# Patient Record
Sex: FEMALE | Race: WHITE | Employment: OTHER | URBAN - METROPOLITAN AREA
[De-identification: names, ages, dates, MRNs, and addresses within clinical notes are randomized per-mention and may not be internally consistent; named-entity substitution may affect disease eponyms.]

---

## 2022-11-18 ENCOUNTER — OFFICE VISIT (OUTPATIENT)
Dept: FAMILY MEDICINE CLINIC | Age: 67
End: 2022-11-18
Payer: MEDICARE

## 2022-11-18 VITALS
TEMPERATURE: 97.3 F | DIASTOLIC BLOOD PRESSURE: 73 MMHG | OXYGEN SATURATION: 97 % | BODY MASS INDEX: 19.2 KG/M2 | RESPIRATION RATE: 16 BRPM | SYSTOLIC BLOOD PRESSURE: 159 MMHG | HEART RATE: 64 BPM | WEIGHT: 108.38 LBS | HEIGHT: 63 IN

## 2022-11-18 DIAGNOSIS — L84 CORN OR CALLUS: ICD-10-CM

## 2022-11-18 DIAGNOSIS — G89.29 CHRONIC LOW BACK PAIN WITH SCIATICA, SCIATICA LATERALITY UNSPECIFIED, UNSPECIFIED BACK PAIN LATERALITY: ICD-10-CM

## 2022-11-18 DIAGNOSIS — K08.9 CHRONIC DENTAL PAIN: ICD-10-CM

## 2022-11-18 DIAGNOSIS — Z23 ENCOUNTER FOR IMMUNIZATION: ICD-10-CM

## 2022-11-18 DIAGNOSIS — I10 ESSENTIAL HYPERTENSION: ICD-10-CM

## 2022-11-18 DIAGNOSIS — J44.9 MIXED TYPE COPD (CHRONIC OBSTRUCTIVE PULMONARY DISEASE) (HCC): ICD-10-CM

## 2022-11-18 DIAGNOSIS — Z00.00 MEDICARE ANNUAL WELLNESS VISIT, SUBSEQUENT: Primary | ICD-10-CM

## 2022-11-18 DIAGNOSIS — I25.10 CORONARY ARTERY DISEASE INVOLVING NATIVE CORONARY ARTERY OF NATIVE HEART WITHOUT ANGINA PECTORIS: ICD-10-CM

## 2022-11-18 DIAGNOSIS — Z85.118 HX OF CANCER OF LUNG: ICD-10-CM

## 2022-11-18 DIAGNOSIS — M54.40 CHRONIC LOW BACK PAIN WITH SCIATICA, SCIATICA LATERALITY UNSPECIFIED, UNSPECIFIED BACK PAIN LATERALITY: ICD-10-CM

## 2022-11-18 DIAGNOSIS — G89.29 CHRONIC DENTAL PAIN: ICD-10-CM

## 2022-11-18 PROBLEM — F17.209 TOBACCO USE DISORDER, CONTINUOUS: Status: ACTIVE | Noted: 2022-11-18

## 2022-11-18 PROCEDURE — G8400 PT W/DXA NO RESULTS DOC: HCPCS | Performed by: FAMILY MEDICINE

## 2022-11-18 PROCEDURE — 1090F PRES/ABSN URINE INCON ASSESS: CPT | Performed by: FAMILY MEDICINE

## 2022-11-18 PROCEDURE — 1101F PT FALLS ASSESS-DOCD LE1/YR: CPT | Performed by: FAMILY MEDICINE

## 2022-11-18 PROCEDURE — G0439 PPPS, SUBSEQ VISIT: HCPCS | Performed by: FAMILY MEDICINE

## 2022-11-18 PROCEDURE — G8510 SCR DEP NEG, NO PLAN REQD: HCPCS | Performed by: FAMILY MEDICINE

## 2022-11-18 PROCEDURE — 1123F ACP DISCUSS/DSCN MKR DOCD: CPT | Performed by: FAMILY MEDICINE

## 2022-11-18 PROCEDURE — 99203 OFFICE O/P NEW LOW 30 MIN: CPT | Performed by: FAMILY MEDICINE

## 2022-11-18 PROCEDURE — G8536 NO DOC ELDER MAL SCRN: HCPCS | Performed by: FAMILY MEDICINE

## 2022-11-18 PROCEDURE — 3077F SYST BP >= 140 MM HG: CPT | Performed by: FAMILY MEDICINE

## 2022-11-18 PROCEDURE — G0008 ADMIN INFLUENZA VIRUS VAC: HCPCS | Performed by: FAMILY MEDICINE

## 2022-11-18 PROCEDURE — 90694 VACC AIIV4 NO PRSRV 0.5ML IM: CPT | Performed by: FAMILY MEDICINE

## 2022-11-18 PROCEDURE — G8427 DOCREV CUR MEDS BY ELIG CLIN: HCPCS | Performed by: FAMILY MEDICINE

## 2022-11-18 PROCEDURE — 3078F DIAST BP <80 MM HG: CPT | Performed by: FAMILY MEDICINE

## 2022-11-18 PROCEDURE — 3017F COLORECTAL CA SCREEN DOC REV: CPT | Performed by: FAMILY MEDICINE

## 2022-11-18 PROCEDURE — G8420 CALC BMI NORM PARAMETERS: HCPCS | Performed by: FAMILY MEDICINE

## 2022-11-18 RX ORDER — BUDESONIDE AND FORMOTEROL FUMARATE DIHYDRATE 160; 4.5 UG/1; UG/1
AEROSOL RESPIRATORY (INHALATION)
COMMUNITY
Start: 2022-08-22 | End: 2022-11-18 | Stop reason: SDUPTHER

## 2022-11-18 RX ORDER — CLOPIDOGREL BISULFATE 75 MG/1
75 TABLET ORAL DAILY
Qty: 90 TABLET | Refills: 1 | Status: SHIPPED | OUTPATIENT
Start: 2022-11-18

## 2022-11-18 RX ORDER — CARVEDILOL 25 MG/1
25 TABLET ORAL 2 TIMES DAILY WITH MEALS
COMMUNITY
End: 2022-11-18 | Stop reason: SDUPTHER

## 2022-11-18 RX ORDER — NITROGLYCERIN 0.4 MG/1
0.4 TABLET SUBLINGUAL
COMMUNITY

## 2022-11-18 RX ORDER — LISINOPRIL 5 MG/1
5 TABLET ORAL DAILY
Qty: 90 TABLET | Refills: 1 | Status: SHIPPED | OUTPATIENT
Start: 2022-11-18

## 2022-11-18 RX ORDER — AMOXICILLIN AND CLAVULANATE POTASSIUM 875; 125 MG/1; MG/1
1 TABLET, FILM COATED ORAL 2 TIMES DAILY
Qty: 20 TABLET | Refills: 0 | Status: SHIPPED | OUTPATIENT
Start: 2022-11-18 | End: 2022-11-28

## 2022-11-18 RX ORDER — CLOPIDOGREL BISULFATE 75 MG/1
TABLET ORAL
COMMUNITY
Start: 2022-08-22 | End: 2022-11-18 | Stop reason: SDUPTHER

## 2022-11-18 RX ORDER — LORAZEPAM 0.5 MG/1
0.5 TABLET ORAL
COMMUNITY

## 2022-11-18 RX ORDER — FLUTICASONE PROPIONATE AND SALMETEROL XINAFOATE 115; 21 UG/1; UG/1
2 AEROSOL, METERED RESPIRATORY (INHALATION) 2 TIMES DAILY
Qty: 12 G | Refills: 5 | Status: SHIPPED | OUTPATIENT
Start: 2022-11-18

## 2022-11-18 RX ORDER — CHOLECALCIFEROL (VITAMIN D3) 125 MCG
100 CAPSULE ORAL DAILY
COMMUNITY

## 2022-11-18 RX ORDER — CARVEDILOL 25 MG/1
25 TABLET ORAL 2 TIMES DAILY WITH MEALS
Qty: 180 TABLET | Refills: 1 | Status: SHIPPED | OUTPATIENT
Start: 2022-11-18

## 2022-11-18 RX ORDER — ATORVASTATIN CALCIUM 40 MG/1
40 TABLET, FILM COATED ORAL DAILY
COMMUNITY
End: 2022-11-18 | Stop reason: SDUPTHER

## 2022-11-18 RX ORDER — PREDNISONE 50 MG/1
50 TABLET ORAL DAILY
COMMUNITY
End: 2022-11-18 | Stop reason: ALTCHOICE

## 2022-11-18 RX ORDER — ATORVASTATIN CALCIUM 40 MG/1
40 TABLET, FILM COATED ORAL DAILY
Qty: 90 TABLET | Refills: 1 | Status: SHIPPED | OUTPATIENT
Start: 2022-11-18

## 2022-11-18 RX ORDER — GABAPENTIN 300 MG/1
300 CAPSULE ORAL 3 TIMES DAILY
Qty: 270 CAPSULE | Refills: 1 | Status: SHIPPED | OUTPATIENT
Start: 2022-11-18

## 2022-11-18 RX ORDER — LISINOPRIL 5 MG/1
TABLET ORAL DAILY
COMMUNITY
End: 2022-11-18 | Stop reason: SDUPTHER

## 2022-11-18 RX ORDER — GABAPENTIN 300 MG/1
CAPSULE ORAL 3 TIMES DAILY
COMMUNITY
Start: 2022-11-02 | End: 2022-11-18 | Stop reason: SDUPTHER

## 2022-11-18 RX ORDER — CYANOCOBALAMIN (VITAMIN B-12) 250 MCG
250 TABLET ORAL DAILY
COMMUNITY

## 2022-11-18 RX ORDER — ALBUTEROL SULFATE 90 UG/1
AEROSOL, METERED RESPIRATORY (INHALATION)
COMMUNITY

## 2022-11-18 RX ORDER — ASPIRIN 81 MG/1
TABLET ORAL DAILY
COMMUNITY

## 2022-11-18 NOTE — PATIENT INSTRUCTIONS
Medicare Wellness Visit, Female     The best way to live healthy is to have a lifestyle where you eat a well-balanced diet, exercise regularly, limit alcohol use, and quit all forms of tobacco/nicotine, if applicable. Regular preventive services are another way to keep healthy. Preventive services (vaccines, screening tests, monitoring & exams) can help personalize your care plan, which helps you manage your own care. Screening tests can find health problems at the earliest stages, when they are easiest to treat. [facility] follows the current, evidence-based guidelines published by the [facility] ([facility]) when recommending preventive services for our patients. Because we follow these guidelines, sometimes recommendations change over time as research supports it. (For example, mammograms used to be recommended annually. Even though Medicare will still pay for an annual mammogram, the newer guidelines recommend a mammogram every two years for women of average risk). Of course, you and your doctor may decide to screen more often for some diseases, based on your risk and your co-morbidities (chronic disease you are already diagnosed with). Preventive services for you include:  - Medicare offers their members a free annual wellness visit, which is time for you and your primary care provider to discuss and plan for your preventive service needs. Take advantage of this benefit every year!  -All adults over the age of 72 should receive the recommended pneumonia vaccines. Current USPSTF guidelines recommend a series of two vaccines for the best pneumonia protection.   -All adults should have a flu vaccine yearly and a tetanus vaccine every 10 years.   -All adults age 48 and older should receive the shingles vaccines (series of two vaccines).       -All adults age 38-68 who are overweight should have a diabetes screening test once every three years.   -All adults born between [birth year] and 1966 should be screened once for Hepatitis C.  -Other screening tests and preventive services for persons with diabetes include: an eye exam to screen for diabetic retinopathy, a kidney function test, a foot exam, and stricter control over your cholesterol.   -Cardiovascular screening for adults with routine risk involves an electrocardiogram (ECG) at intervals determined by your doctor.   -Colorectal cancer screenings should be done for adults age 54-65 with no increased risk factors for colorectal cancer. There are a number of acceptable methods of screening for this type of cancer. Each test has its own benefits and drawbacks. Discuss with your doctor what is most appropriate for you during your annual wellness visit. The different tests include: colonoscopy (considered the best screening method), a fecal occult blood test, a fecal DNA test, and sigmoidoscopy.    -A bone mass density test is recommended when a woman turns 65 to screen for osteoporosis. This test is only recommended one time, as a screening. Some providers will use this same test as a disease monitoring tool if you already have osteoporosis. -Breast cancer screenings are recommended every other year for women of normal risk, age 54-69.  -Cervical cancer screenings for women over age 72 are only recommended with certain risk factors.      Here is a list of your current Health Maintenance items (your personalized list of preventive services) with a due date:  Health Maintenance Due   Topic Date Due    Hepatitis C Test  Never done    Depresssion Screening  Never done    COVID-19 Vaccine (1) Never done    DTaP/Tdap/Td  (1 - Tdap) Never done    Cholesterol Test   Never done    Colorectal Screening  Never done    Shingles Vaccine (1 of 2) Never done    Mammogram  Never done    Bone Mineral Density   Never done    Pneumococcal Vaccine (1 - PCV) Never done    Yearly Flu Vaccine (1) Never done

## 2022-11-18 NOTE — PROGRESS NOTES
11/18/2022      Chief Complaint   Patient presents with    [facility]    Annual Wellness Visit         History of Present Illness:        Tracie Jimenez is a 75 y.o. female relocated from [facility] to be near her son [facility]. Has CAD and COPD, continues to smoke. Previously on lorazepam for anxiety, has been off since August. Declines to treat this with SSRIs or other mood agents. Taking gabapentin chronically for low back pain with radicular sxs. Having painful corns on the feet, had seen podiatry before leaving [place]. Allergies   Allergen Reactions    Sulfa (Sulfonamide Antibiotics) Hives       Current Outpatient Medications   Medication Sig    aspirin delayed-release 81 mg tablet Take  by mouth daily. nitroglycerin (NITROSTAT) 0.4 mg SL tablet 0.4 mg by SubLINGual route every five (5) minutes as needed for Chest Pain. Up to 3 doses. albuterol (PROVENTIL HFA, VENTOLIN HFA, PROAIR HFA) 90 mcg/actuation inhaler Take  by inhalation. co-enzyme Q-10 (Co Q-10) 100 mg capsule Take 100 mg by mouth daily. cyanocobalamin (VITAMIN B12) 250 mcg tablet Take 250 mcg by mouth daily. atorvastatin (LIPITOR) 40 mg tablet Take 1 Tablet by mouth daily. fluticasone propion-salmeteroL (Advair HFA) 115-21 mcg/actuation inhaler Take 2 Puffs by inhalation two (2) times a day. carvediloL (COREG) 25 mg tablet Take 1 Tablet by mouth two (2) times daily (with meals). clopidogreL (PLAVIX) 75 mg tab Take 1 Tablet by mouth daily. gabapentin (NEURONTIN) 300 mg capsule Take 1 Capsule by mouth three (3) times daily. Max Daily Amount: 900 mg.    ipratropium (Atrovent HFA) 17 mcg/actuation inhaler Take 2 Puffs by inhalation every four (4) hours as needed for Wheezing. lisinopriL (PRINIVIL, ZESTRIL) 5 mg tablet Take 1 Tablet by mouth daily. amoxicillin-clavulanate (AUGMENTIN) 875-125 mg per tablet Take 1 Tablet by mouth two (2) times a day for 10 days.     LORazepam (ATIVAN) 0.5 mg tablet Take 0.5 mg by mouth. (Patient not taking: Reported on 11/18/2022)     No current facility-administered medications for this visit. Physical Examination:    Visit Vitals  BP (!) 159/73 (BP 1 Location: Left upper arm, BP Patient Position: Sitting, BP Cuff Size: Adult)   Pulse 64   Temp 97.3 °F (36.3 °C) (Oral)   Resp 16   Ht 5' 3\" (1.6 m)   Wt 108 lb 6 oz (49.2 kg)   SpO2 97%   BMI 19.20 kg/m²      General:  Alert, cooperative, no distress. HEENT:  Normocephalic, without obvious abnormality, atraumatic. Conjunctivae/corneas clear. Pupils equal, round, reactive to light. Extraocular movements intact. TMs and external canals normal bilaterally. Nasal mucosa and oropharynx clear. Lungs: Clear to auscultation bilaterally. Chest wall:  No tenderness or deformity. Heart:  Regular rate and rhythm, S1, S2 normal, no murmur, click, rub, or gallop. Abdomen:   Soft, non-tender. Bowel sounds normal. No masses. No organomegaly. Extremities: Extremities normal, atraumatic, no cyanosis or edema. Pulses: 2+ and symmetric all extremities. Skin: Skin color, texture, turgor normal. No rashes or lesions. Lymph nodes: Cervical, supraclavicular, and axillary nodes normal.   Neurologic: CNII-XII intact. Normal strength, sensation, and reflexes throughout. ASSESSMENT AND PLAN    1. Medicare annual wellness visit, subsequent      2. Hx of cancer of lung  Encouraged to stop smoking    3. Coronary artery disease involving native coronary artery of native heart without angina pectoris    - atorvastatin (LIPITOR) 40 mg tablet; Take 1 Tablet by mouth daily. Dispense: 90 Tablet; Refill: 1  - carvediloL (COREG) 25 mg tablet; Take 1 Tablet by mouth two (2) times daily (with meals). Dispense: 180 Tablet; Refill: 1  - clopidogreL (PLAVIX) 75 mg tab; Take 1 Tablet by mouth daily. Dispense: 90 Tablet; Refill: 1    4.  Chronic low back pain with sciatica, sciatica laterality unspecified, unspecified back pain laterality    - gabapentin (NEURONTIN) 300 mg capsule; Take 1 Capsule by mouth three (3) times daily. Max Daily Amount: 900 mg. Dispense: 270 Capsule; Refill: 1    5. Essential hypertension    - carvediloL (COREG) 25 mg tablet; Take 1 Tablet by mouth two (2) times daily (with meals). Dispense: 180 Tablet; Refill: 1  - lisinopriL (PRINIVIL, ZESTRIL) 5 mg tablet; Take 1 Tablet by mouth daily. Dispense: 90 Tablet; Refill: 1    6. Corn or callus    - REFERRAL TO PODIATRY    7. Encounter for immunization    - INFLUENZA, FLUAD, (AGE 65 Y+), IM, PF, 0.5 ML    8. Mixed type COPD (chronic obstructive pulmonary disease) (HCC)    - fluticasone propion-salmeteroL (Advair HFA) 115-21 mcg/actuation inhaler; Take 2 Puffs by inhalation two (2) times a day. Dispense: 12 g; Refill: 5  - ipratropium (Atrovent HFA) 17 mcg/actuation inhaler; Take 2 Puffs by inhalation every four (4) hours as needed for Wheezing. Dispense: 12.9 g; Refill: 5    9. Chronic dental pain    - amoxicillin-clavulanate (AUGMENTIN) 875-125 mg per tablet; Take 1 Tablet by mouth two (2) times a day for 10 days. Dispense: 20 Tablet; Refill: 0            Orders Placed This Encounter    Influenza, FLUAD, (age 72 y+), IM, PF, 0.5 mL    REFERRAL TO PODIATRY     Referral Priority:   Routine     Referral Type:   Consultation     Referral Reason:   Specialty Services Required     Referred to Provider:   Mary Shea DPM     Number of Visits Requested:   1    DISCONTD: budesonide-formoteroL (SYMBICORT) 160-4.5 mcg/actuation HFAA    DISCONTD: gabapentin (NEURONTIN) 300 mg capsule     Sig: three (3) times daily. DISCONTD: clopidogreL (PLAVIX) 75 mg tab    DISCONTD: atorvastatin (LIPITOR) 40 mg tablet     Sig: Take 40 mg by mouth daily. DISCONTD: predniSONE (DELTASONE) 50 mg tablet     Sig: Take 50 mg by mouth daily. aspirin delayed-release 81 mg tablet     Sig: Take  by mouth daily.     nitroglycerin (NITROSTAT) 0.4 mg SL tablet     Si.4 mg by SubLINGual route every five (5) minutes as needed for Chest Pain. Up to 3 doses. LORazepam (ATIVAN) 0.5 mg tablet     Sig: Take 0.5 mg by mouth. DISCONTD: carvediloL (COREG) 25 mg tablet     Sig: Take 25 mg by mouth two (2) times daily (with meals). DISCONTD: lisinopriL (PRINIVIL, ZESTRIL) 5 mg tablet     Sig: Take  by mouth daily. DISCONTD: ipratropium (Atrovent HFA) 17 mcg/actuation inhaler     Sig: Take 1 Puff by inhalation. DISCONTD: fluticasone propionate (FLOVENT DISKUS) 50 mcg/actuation inhaler     Sig: Take  by inhalation. albuterol (PROVENTIL HFA, VENTOLIN HFA, PROAIR HFA) 90 mcg/actuation inhaler     Sig: Take  by inhalation. co-enzyme Q-10 (Co Q-10) 100 mg capsule     Sig: Take 100 mg by mouth daily. cyanocobalamin (VITAMIN B12) 250 mcg tablet     Sig: Take 250 mcg by mouth daily. atorvastatin (LIPITOR) 40 mg tablet     Sig: Take 1 Tablet by mouth daily. Dispense:  90 Tablet     Refill:  1    fluticasone propion-salmeteroL (Advair HFA) 115-21 mcg/actuation inhaler     Sig: Take 2 Puffs by inhalation two (2) times a day. Dispense:  12 g     Refill:  5    carvediloL (COREG) 25 mg tablet     Sig: Take 1 Tablet by mouth two (2) times daily (with meals). Dispense:  180 Tablet     Refill:  1    clopidogreL (PLAVIX) 75 mg tab     Sig: Take 1 Tablet by mouth daily. Dispense:  90 Tablet     Refill:  1    gabapentin (NEURONTIN) 300 mg capsule     Sig: Take 1 Capsule by mouth three (3) times daily. Max Daily Amount: 900 mg. Dispense:  270 Capsule     Refill:  1    ipratropium (Atrovent HFA) 17 mcg/actuation inhaler     Sig: Take 2 Puffs by inhalation every four (4) hours as needed for Wheezing. Dispense:  12.9 g     Refill:  5    lisinopriL (PRINIVIL, ZESTRIL) 5 mg tablet     Sig: Take 1 Tablet by mouth daily. Dispense:  90 Tablet     Refill:  1    amoxicillin-clavulanate (AUGMENTIN) 875-125 mg per tablet     Sig: Take 1 Tablet by mouth two (2) times a day for 10 days.      Dispense:  20 Tablet     Refill:  0     RTC 3 months      Gracie Santos MD

## 2022-11-18 NOTE — PROGRESS NOTES
1. \"Have you been to the ER, urgent care clinic since your last visit? Hospitalized since your last visit? \" No    2. \"Have you seen or consulted any other health care providers outside of the [address] [place] [address] since your last visit? \" No     3. For patients aged 39-70: Has the patient had a colonoscopy / FIT/ Cologuard? No      If the patient is female:    4. For patients aged 41-77: Has the patient had a mammogram within the past 2 years? No      5. For patients aged 21-65: Has the patient had a pap smear? NA - based on age or sex      Functional Ability:   Does the patient exhibit a steady gait? yes   How long did it take the patient to get up and walk from a sitting position? 10 seconds   Is the patient self reliant?  (ie can do own laundry, meals, household chores)  yes     Does the patient handle his/her own medications? yes     Does the patient handle his/her own money? yes     Is the patients home safe (ie good lighting, handrails on stairs and bath, etc.)? yes     Did you notice or did patient express any hearing difficulties? no     Did you notice or did patient express any vision difficulties?   no     Were distance and reading eye charts used? no       Advance Care Planning:   Patient was offered the opportunity to discuss advance care planning:  yes     Does patient have an Advance Directive:  no   If no, did you provide information on Caring Connections? yes     No flowsheet data found. Abuse Screening Questionnaire 11/18/2022   Do you ever feel afraid of your partner? N   Are you in a relationship with someone who physically or mentally threatens you? N   Is it safe for you to go home? Y         This is the Subsequent Medicare Annual Wellness Exam, performed 12 months or more after the Initial AWV or the last Subsequent AWV    I have reviewed the patient's medical history in detail and updated the computerized patient record.        Assessment/Plan   Education and counseling provided:  Are appropriate based on today's review and evaluation    1. Medicare annual wellness visit, subsequent     Depression Risk Factor Screening     3 most recent PHQ Screens 11/18/2022   Little interest or pleasure in doing things Not at all   Feeling down, depressed, irritable, or hopeless Not at all   Total Score PHQ 2 0       Alcohol & Drug Abuse Risk Screen    Do you average more than 1 drink per night or more than 7 drinks a week:  No    On any one occasion in the past three months have you have had more than 3 drinks containing alcohol:  Yes          Functional Ability and Level of Safety    Hearing: Hearing is good. Activities of Daily Living: The home contains: no safety equipment. Patient does total self care      Ambulation: with no difficulty     Fall Risk:  Fall Risk Assessment, last 12 mths 11/18/2022   Able to walk? Yes   Fall in past 12 months? 0   Do you feel unsteady? 0   Are you worried about falling 0      Abuse Screen:  Patient is not abused       Cognitive Screening    Has your family/caregiver stated any concerns about your memory: no     Cognitive Screening: Normal - Clock Drawing Test    Health Maintenance Due     Health Maintenance Due   Topic Date Due    Hepatitis C Screening  Never done    Depression Screen  Never done    COVID-19 Vaccine (1) Never done    DTaP/Tdap/Td series (1 - Tdap) Never done    Lipid Screen  Never done    Colorectal Cancer Screening Combo  Never done    Shingrix Vaccine Age 50> (1 of 2) Never done    Breast Cancer Screen Mammogram  Never done    Bone Densitometry (Dexa) Screening  Never done    Pneumococcal 65+ years (1 - PCV) Never done    Flu Vaccine (1) Never done       Patient Care Team   Patient Care Team:  Anna Schwartz MD as PCP - General (Family Medicine)    History   There is no problem list on file for this patient. No past medical history on file. No past surgical history on file.   Current Outpatient Medications   Medication Sig Dispense Refill    budesonide-formoteroL (SYMBICORT) 160-4.5 mcg/actuation HFAA       gabapentin (NEURONTIN) 300 mg capsule three (3) times daily. clopidogreL (PLAVIX) 75 mg tab       atorvastatin (LIPITOR) 40 mg tablet Take 40 mg by mouth daily. predniSONE (DELTASONE) 50 mg tablet Take 50 mg by mouth daily. aspirin delayed-release 81 mg tablet Take  by mouth daily. nitroglycerin (NITROSTAT) 0.4 mg SL tablet 0.4 mg by SubLINGual route every five (5) minutes as needed for Chest Pain. Up to 3 doses. carvediloL (COREG) 25 mg tablet Take 25 mg by mouth two (2) times daily (with meals). lisinopriL (PRINIVIL, ZESTRIL) 5 mg tablet Take  by mouth daily. ipratropium (Atrovent HFA) 17 mcg/actuation inhaler Take 1 Puff by inhalation. fluticasone propionate (FLOVENT DISKUS) 50 mcg/actuation inhaler Take  by inhalation. albuterol (PROVENTIL HFA, VENTOLIN HFA, PROAIR HFA) 90 mcg/actuation inhaler Take  by inhalation. co-enzyme Q-10 (Co Q-10) 100 mg capsule Take 100 mg by mouth daily. cyanocobalamin (VITAMIN B12) 250 mcg tablet Take 250 mcg by mouth daily. LORazepam (ATIVAN) 0.5 mg tablet Take 0.5 mg by mouth. (Patient not taking: Reported on 11/18/2022)       Allergies   Allergen Reactions    Sulfa (Sulfonamide Antibiotics) Hives       No family history on file.   Social History     Tobacco Use    Smoking status: Every Day     Types: Cigarettes    Smokeless tobacco: Never   Substance Use Topics    Alcohol use: Yes         Brittany Huerta LPN

## 2022-12-21 RX ORDER — ALBUTEROL SULFATE 90 UG/1
2 AEROSOL, METERED RESPIRATORY (INHALATION)
Qty: 18 G | Refills: 3 | Status: SHIPPED | OUTPATIENT
Start: 2022-12-21

## 2023-01-18 RX ORDER — NITROGLYCERIN 0.4 MG/1
0.4 TABLET SUBLINGUAL
Qty: 20 TABLET | Refills: 5 | Status: SHIPPED | OUTPATIENT
Start: 2023-01-18

## 2023-01-18 NOTE — TELEPHONE ENCOUNTER
Requested Prescriptions     Pending Prescriptions Disp Refills    nitroglycerin (NITROSTAT) 0.4 mg SL tablet       Si Tablet by SubLINGual route every five (5) minutes as needed for Chest Pain. Up to 3 doses.      Patient says her prescription has

## 2023-02-07 ENCOUNTER — VIRTUAL VISIT (OUTPATIENT)
Dept: FAMILY MEDICINE CLINIC | Age: 68
End: 2023-02-07
Payer: MEDICARE

## 2023-02-07 DIAGNOSIS — J44.9 MIXED TYPE COPD (CHRONIC OBSTRUCTIVE PULMONARY DISEASE) (HCC): ICD-10-CM

## 2023-02-07 DIAGNOSIS — R05.9 COUGH IN ADULT: Primary | ICD-10-CM

## 2023-02-07 PROCEDURE — 3017F COLORECTAL CA SCREEN DOC REV: CPT | Performed by: FAMILY MEDICINE

## 2023-02-07 PROCEDURE — G8432 DEP SCR NOT DOC, RNG: HCPCS | Performed by: FAMILY MEDICINE

## 2023-02-07 PROCEDURE — 1123F ACP DISCUSS/DSCN MKR DOCD: CPT | Performed by: FAMILY MEDICINE

## 2023-02-07 PROCEDURE — G8420 CALC BMI NORM PARAMETERS: HCPCS | Performed by: FAMILY MEDICINE

## 2023-02-07 PROCEDURE — G8400 PT W/DXA NO RESULTS DOC: HCPCS | Performed by: FAMILY MEDICINE

## 2023-02-07 PROCEDURE — 1090F PRES/ABSN URINE INCON ASSESS: CPT | Performed by: FAMILY MEDICINE

## 2023-02-07 PROCEDURE — G8427 DOCREV CUR MEDS BY ELIG CLIN: HCPCS | Performed by: FAMILY MEDICINE

## 2023-02-07 PROCEDURE — G8536 NO DOC ELDER MAL SCRN: HCPCS | Performed by: FAMILY MEDICINE

## 2023-02-07 PROCEDURE — 1101F PT FALLS ASSESS-DOCD LE1/YR: CPT | Performed by: FAMILY MEDICINE

## 2023-02-07 PROCEDURE — G2025 DIS SITE TELE SVCS RHC/FQHC: HCPCS | Performed by: FAMILY MEDICINE

## 2023-02-07 NOTE — PROGRESS NOTES
Jess Simmons is a 75 y.o. female who was seen by synchronous (real-time) audio-video technology on 2/7/2023 for Cough (Productive with Greenish/Yellow Phlegm )        Assessment & Plan:   Diagnoses and all orders for this visit:    1. Cough in adult    2. Mixed type COPD (chronic obstructive pulmonary disease) ([facility] [address]      Patient declines to do drive through COVID testing as first step in diagnosis and treatment despite explanation of it's importance. Questioned answered. She will call if she changes her mind. Subjective:   Ten days of cough productive of sputum without fever and no increased SOB or wheezing. No CP. Had initial COVID series, no subsequent boosters. No known sick contacts. Prior to Admission medications    Medication Sig Start Date End Date Taking? Authorizing Provider   nitroglycerin (NITROSTAT) 0.4 mg SL tablet 1 Tablet by SubLINGual route every five (5) minutes as needed for Chest Pain. Up to 3 doses. 1/18/23  Yes Damaris Chavez MD   albuterol (PROVENTIL HFA, VENTOLIN HFA, PROAIR HFA) 90 mcg/actuation inhaler Take 2 Puffs by inhalation every four (4) hours as needed for Wheezing. 12/21/22  Yes Damaris Chavez MD   aspirin delayed-release 81 mg tablet Take  by mouth daily. Yes Provider, Historical   co-enzyme Q-10 (CO Q-10) 100 mg capsule Take 100 mg by mouth daily. Yes Provider, Historical   cyanocobalamin (VITAMIN B12) 250 mcg tablet Take 250 mcg by mouth daily. Yes Provider, Historical   atorvastatin (LIPITOR) 40 mg tablet Take 1 Tablet by mouth daily. 11/18/22  Yes Damaris Chavez MD   fluticasone propion-salmeteroL (Advair HFA) 115-21 mcg/actuation inhaler Take 2 Puffs by inhalation two (2) times a day. 11/18/22  Yes Damaris Chavez MD   carvediloL (COREG) 25 mg tablet Take 1 Tablet by mouth two (2) times daily (with meals). 11/18/22  Yes Damaris Chavez MD   clopidogreL (PLAVIX) 75 mg tab Take 1 Tablet by mouth daily.  11/18/22  Yes Damaris Chavez MD gabapentin (NEURONTIN) 300 mg capsule Take 1 Capsule by mouth three (3) times daily. Max Daily Amount: 900 mg. 11/18/22  Yes Damaris Chavez MD   ipratropium (Atrovent HFA) 17 mcg/actuation inhaler Take 2 Puffs by inhalation every four (4) hours as needed for Wheezing. 11/18/22  Yes Damaris Chavez MD   lisinopriL (PRINIVIL, ZESTRIL) 5 mg tablet Take 1 Tablet by mouth daily. 11/18/22  Yes Damaris Chavez MD   LORazepam (ATIVAN) 0.5 mg tablet Take 0.5 mg by mouth. Patient not taking: No sig reported    Provider, Historical     Patient Active Problem List   Diagnosis Code    Hx of cancer of lung Z85.118    Coronary artery disease involving native coronary artery I25.10    Mixed type COPD (chronic obstructive pulmonary disease) ([facility]) J44.9    Essential hypertension I10    Tobacco use disorder, continuous F17.209     Current Outpatient Medications   Medication Sig Dispense Refill    nitroglycerin (NITROSTAT) 0.4 mg SL tablet 1 Tablet by SubLINGual route every five (5) minutes as needed for Chest Pain. Up to 3 doses. 20 Tablet 5    albuterol (PROVENTIL HFA, VENTOLIN HFA, PROAIR HFA) 90 mcg/actuation inhaler Take 2 Puffs by inhalation every four (4) hours as needed for Wheezing. 18 g 3    aspirin delayed-release 81 mg tablet Take  by mouth daily. co-enzyme Q-10 (CO Q-10) 100 mg capsule Take 100 mg by mouth daily. cyanocobalamin (VITAMIN B12) 250 mcg tablet Take 250 mcg by mouth daily. atorvastatin (LIPITOR) 40 mg tablet Take 1 Tablet by mouth daily. 90 Tablet 1    fluticasone propion-salmeteroL (Advair HFA) 115-21 mcg/actuation inhaler Take 2 Puffs by inhalation two (2) times a day. 12 g 5    carvediloL (COREG) 25 mg tablet Take 1 Tablet by mouth two (2) times daily (with meals). 180 Tablet 1    clopidogreL (PLAVIX) 75 mg tab Take 1 Tablet by mouth daily. 90 Tablet 1    gabapentin (NEURONTIN) 300 mg capsule Take 1 Capsule by mouth three (3) times daily.  Max Daily Amount: 900 mg. 270 Capsule 1 ipratropium (Atrovent HFA) 17 mcg/actuation inhaler Take 2 Puffs by inhalation every four (4) hours as needed for Wheezing. 12.9 g 5    lisinopriL (PRINIVIL, ZESTRIL) 5 mg tablet Take 1 Tablet by mouth daily. 90 Tablet 1    LORazepam (ATIVAN) 0.5 mg tablet Take 0.5 mg by mouth. (Patient not taking: No sig reported)       Allergies   Allergen Reactions    Sulfa (Sulfonamide Antibiotics) Hives       ROS    Objective:   No flowsheet data found. [INSTRUCTIONS:  \"[x]\" Indicates a positive item  \"[]\" Indicates a negative item  -- DELETE ALL ITEMS NOT EXAMINED]    Constitutional: [x] Appears well-developed and well-nourished [x] No apparent distress      [] Abnormal -     Mental status: [x] Alert and awake  [x] Oriented to person/place/time [x] Able to follow commands    [] Abnormal -     Eyes:   EOM    [x]  Normal    [] Abnormal -   Sclera  [x]  Normal    [] Abnormal -          Discharge [x]  None visible   [] Abnormal -     HENT: [x] Normocephalic, atraumatic  [] Abnormal -   [x] Mouth/Throat: Mucous membranes are moist    External Ears [x] Normal  [] Abnormal -    Neck: [x] No visualized mass [] Abnormal -     Pulmonary/Chest: [x] Respiratory effort normal   [x] No visualized signs of difficulty breathing or respiratory distress        [] Abnormal -      Musculoskeletal:   [x] Normal gait with no signs of ataxia         [x] Normal range of motion of neck        [] Abnormal -     Neurological:        [x] No Facial Asymmetry (Cranial nerve 7 motor function) (limited exam due to video visit)          [x] No gaze palsy        [] Abnormal -          Skin:        [x] No significant exanthematous lesions or discoloration noted on facial skin         [] Abnormal -            Psychiatric:       [x] Normal Affect [] Abnormal -        [x] No Hallucinations    Other pertinent observable physical exam findings:-        We discussed the expected course, resolution and complications of the diagnosis(es) in detail.   Medication risks, benefits, costs, interactions, and alternatives were discussed as indicated. I advised her to contact the office if her condition worsens, changes or fails to improve as anticipated. She expressed understanding with the diagnosis(es) and plan. Victoria Multani, was evaluated through a synchronous (real-time) audio-video encounter. The patient (or guardian if applicable) is aware that this is a billable service, which includes applicable co-pays. This Virtual Visit was conducted with patient's (and/or legal guardian's) consent. The visit was conducted pursuant to the emergency declaration under the [address] [place] [address], [address] [place] [address] authority and the [employer] General Act. Patient identification was verified, and a caregiver was present when appropriate. The patient was located at: Home: [address] [place] [address]  The provider was located at:  Facility (Appt Department): [id number]        Sandhya Obrien MD

## 2023-02-07 NOTE — PROGRESS NOTES
1. \"Have you been to the ER, urgent care clinic since your last visit? Hospitalized since your last visit? \" No    2. \"Have you seen or consulted any other health care providers outside of the [address] [place] [address] since your last visit? \" No     3. For patients aged 39-70: Has the patient had a colonoscopy / FIT/ Cologuard? No      If the patient is female:    4. For patients aged 41-77: Has the patient had a mammogram within the past 2 years? No      5. For patients aged 21-65: Has the patient had a pap smear?  NA - based on age or sex

## 2023-02-23 DIAGNOSIS — I10 ESSENTIAL HYPERTENSION: ICD-10-CM

## 2023-02-23 DIAGNOSIS — I25.10 CORONARY ARTERY DISEASE INVOLVING NATIVE CORONARY ARTERY OF NATIVE HEART WITHOUT ANGINA PECTORIS: ICD-10-CM

## 2023-02-23 RX ORDER — CARVEDILOL 25 MG/1
TABLET ORAL
Qty: 180 TABLET | Refills: 0 | Status: SHIPPED | OUTPATIENT
Start: 2023-02-23

## 2023-04-14 ENCOUNTER — APPOINTMENT (OUTPATIENT)
Dept: GENERAL RADIOLOGY | Age: 68
End: 2023-04-14
Attending: EMERGENCY MEDICINE
Payer: MEDICARE

## 2023-04-14 ENCOUNTER — APPOINTMENT (OUTPATIENT)
Dept: CT IMAGING | Age: 68
End: 2023-04-14
Attending: EMERGENCY MEDICINE
Payer: MEDICARE

## 2023-04-14 ENCOUNTER — HOSPITAL ENCOUNTER (EMERGENCY)
Age: 68
Discharge: HOME | End: 2023-04-14
Attending: EMERGENCY MEDICINE
Payer: MEDICARE

## 2023-04-14 VITALS
HEART RATE: 65 BPM | WEIGHT: 100 LBS | TEMPERATURE: 97.8 F | RESPIRATION RATE: 18 BRPM | HEIGHT: 63 IN | BODY MASS INDEX: 17.72 KG/M2 | DIASTOLIC BLOOD PRESSURE: 62 MMHG | OXYGEN SATURATION: 94 % | SYSTOLIC BLOOD PRESSURE: 103 MMHG

## 2023-04-14 DIAGNOSIS — R91.8 LUNG MASS: Primary | ICD-10-CM

## 2023-04-14 DIAGNOSIS — J44.1 ACUTE EXACERBATION OF CHRONIC OBSTRUCTIVE PULMONARY DISEASE (COPD) (HCC): ICD-10-CM

## 2023-04-14 PROCEDURE — 71250 CT THORAX DX C-: CPT

## 2023-04-14 PROCEDURE — 94640 AIRWAY INHALATION TREATMENT: CPT

## 2023-04-14 PROCEDURE — A9270 NON-COVERED ITEM OR SERVICE: HCPCS | Performed by: EMERGENCY MEDICINE

## 2023-04-14 PROCEDURE — 74011000250 HC RX REV CODE- 250: Performed by: EMERGENCY MEDICINE

## 2023-04-14 PROCEDURE — 71045 X-RAY EXAM CHEST 1 VIEW: CPT

## 2023-04-14 PROCEDURE — 99284 EMERGENCY DEPT VISIT MOD MDM: CPT

## 2023-04-14 PROCEDURE — 74011250637 HC RX REV CODE- 250/637: Performed by: EMERGENCY MEDICINE

## 2023-04-14 PROCEDURE — 74011636637 HC RX REV CODE- 636/637: Performed by: EMERGENCY MEDICINE

## 2023-04-14 RX ORDER — AZITHROMYCIN 250 MG/1
500 TABLET, FILM COATED ORAL
Status: COMPLETED | OUTPATIENT
Start: 2023-04-14 | End: 2023-04-14

## 2023-04-14 RX ORDER — AZITHROMYCIN 250 MG/1
TABLET, FILM COATED ORAL
Qty: 6 TABLET | Refills: 0 | Status: SHIPPED | OUTPATIENT
Start: 2023-04-14 | End: 2023-04-19

## 2023-04-14 RX ORDER — PREDNISONE 20 MG/1
40 TABLET ORAL DAILY
Qty: 10 TABLET | Refills: 0 | Status: SHIPPED | OUTPATIENT
Start: 2023-04-14 | End: 2023-04-19

## 2023-04-14 RX ORDER — AMOXICILLIN AND CLAVULANATE POTASSIUM 875; 125 MG/1; MG/1
1 TABLET, FILM COATED ORAL 2 TIMES DAILY
Qty: 14 TABLET | Refills: 0 | Status: SHIPPED | OUTPATIENT
Start: 2023-04-14 | End: 2023-04-21 | Stop reason: ALTCHOICE

## 2023-04-14 RX ORDER — IPRATROPIUM BROMIDE AND ALBUTEROL SULFATE 2.5; .5 MG/3ML; MG/3ML
3 SOLUTION RESPIRATORY (INHALATION)
Status: COMPLETED | OUTPATIENT
Start: 2023-04-14 | End: 2023-04-14

## 2023-04-14 RX ORDER — PREDNISONE 20 MG/1
40 TABLET ORAL ONCE
Status: COMPLETED | OUTPATIENT
Start: 2023-04-14 | End: 2023-04-14

## 2023-04-14 RX ADMIN — PREDNISONE 40 MG: 20 TABLET ORAL at 13:19

## 2023-04-14 RX ADMIN — IPRATROPIUM BROMIDE AND ALBUTEROL SULFATE 3 ML: 2.5; .5 SOLUTION RESPIRATORY (INHALATION) at 11:06

## 2023-04-14 RX ADMIN — AZITHROMYCIN MONOHYDRATE 500 MG: 250 TABLET ORAL at 13:19

## 2023-04-14 NOTE — DISCHARGE INSTRUCTIONS
Your CT scan shows a mass on your left lung. This is most likely a lung cancer. Please call our cancer doctor or any oncologist to schedule an appointment as soon as possible for a further work-up.

## 2023-04-14 NOTE — ED PROVIDER NOTES
EMERGENCY DEPARTMENT HISTORY AND PHYSICAL EXAM          Date: 4/14/2023  Patient Name: Leobardo Justice    History of Presenting Illness     Chief Complaint   Patient presents with    Cough       History Provided By: Patient    HPI: Leobardo Justice is a 70 y.o. female, pmhx listed below, who presents to the ED c/o cough, congestion. Patient reports she feels like her COPD is \"acting up\". Reports when she has these kind of episodes, she usually feels better with a steroid and an antibiotic. States that she is a long-term smoker, has a diagnosis of COPD, never used home oxygen. Has a nebulizer machine but no albuterol for it. States she has not had a fever. Cough is productive of yellowish phlegm. Symptoms began approximately 2 weeks ago. PCP: None        Past History       Past Medical History:  Past Medical History:   Diagnosis Date    Coronary artery disease involving native coronary artery     AMI 2005    Essential hypertension 11/18/2022    Hx of cancer of lung 2009    ILYA lobectomy    Mixed type COPD (chronic obstructive pulmonary disease) ([facility] [address] 11/18/2022    Tobacco use disorder, continuous        Past Surgical History:  Past Surgical History:   Procedure Laterality Date    HX APPENDECTOMY N/A     HX CHOLECYSTECTOMY  2009    HX LOBECTOMY Left 2009    ILYA    HX LUMBAR LAMINECTOMY  1998    HX PTCA  2005    Stent x 2    HX TUBAL LIGATION         Family History:  History reviewed. No pertinent family history. Social History:  Social History     Tobacco Use    Smoking status: Every Day     Packs/day: 1.00     Years: 53.00     Pack years: 53.00     Types: Cigarettes    Smokeless tobacco: Never   Vaping Use    Vaping Use: Never used   Substance Use Topics    Alcohol use: Not Currently     Alcohol/week: 2.0 standard drinks     Types: 2 Cans of beer per week    Drug use: Never       Physical Exam     Vital Signs-Reviewed the patient's vital signs.   Patient Vitals for the past 12 hrs:   Temp Pulse Resp BP SpO2 04/14/23 1117 -- -- -- -- 94 %   04/14/23 1115 -- -- -- 103/62 --   04/14/23 1106 -- -- -- -- 95 %   04/14/23 1035 97.8 °F (36.6 °C) 65 18 (!) 141/66 94 %       Physical Exam  Constitutional:       Appearance: Normal appearance. Comments: Very thin   HENT:      Head: Normocephalic and atraumatic. Mouth/Throat:      Mouth: Mucous membranes are moist.      Comments: Poor dentition with stained teeth  Eyes:      Pupils: Pupils are equal, round, and reactive to light. Cardiovascular:      Rate and Rhythm: Normal rate and regular rhythm. Pulmonary:      Breath sounds: Wheezing present. Comments: Minimal bilateral wheezing with diminished air movement  Musculoskeletal:         General: No swelling. Right lower leg: No edema. Left lower leg: No edema. Skin:     General: Skin is warm and dry. Neurological:      Mental Status: She is alert and oriented to person, place, and time. Psychiatric:         Mood and Affect: Mood normal.       Diagnostic Study Results     Labs -   No results found for this or any previous visit (from the past 12 hour(s)). Radiologic Studies -   CT CHEST WO CONT   Final Result   1.  3.3 x 3.0 cm mass in the left upper lobe, highly concerning for malignancy. Mass is amenable to percutaneous biopsy. 2.  Multifocal consolidations in the left lower lobe, lingula, and right middle   lobe. These may represent pneumonia versus metastatic disease. XR CHEST PORT   Final Result   1. 3.4 x 2.9 cm mass in left mid to upper lung. 2. Nonspecific patchy left basilar pulmonary densities. 3. Small left pleural effusion. CT Results  (Last 48 hours)                 04/14/23 1135  CT CHEST WO CONT Final result    Impression:  1.  3.3 x 3.0 cm mass in the left upper lobe, highly concerning for malignancy. Mass is amenable to percutaneous biopsy. 2.  Multifocal consolidations in the left lower lobe, lingula, and right middle   lobe.  These may represent pneumonia versus metastatic disease. Narrative:  INDICATION: Abnormal chest x-ray       COMPARISON: Chest radiograph 4/14/2023       CONTRAST: None. TECHNIQUE:  5 mm axial images were obtained through the chest. Coronal and   sagittal reformats were generated. CT dose reduction was achieved through use   of a standardized protocol tailored for this examination and automatic exposure   control for dose modulation. The absence of intravenous contrast reduces the sensitivity for evaluation of   the mediastinum, luis, vasculature, and upper abdominal organs. FINDINGS:       CHEST WALL: No mass or axillary lymphadenopathy. THYROID: No nodule. MEDIASTINUM: No mass or lymphadenopathy. LUIS: No mass or lymphadenopathy. THORACIC AORTA: No aneurysm. MAIN PULMONARY ARTERY: Normal in caliber. TRACHEA/BRONCHI: Patent. ESOPHAGUS: No wall thickening or dilatation. HEART: Normal in size. Coronary artery calcium: absent   PLEURA: No effusion or pneumothorax. LUNGS: 3.3 x 3.0 x 2.2 cm mass in the left upper lobe. There is multifocal   consolidation in the left lower lobe and to a lesser extent lingula and right   middle lobe. INCIDENTALLY IMAGED UPPER ABDOMEN: 2 cm low-density left adrenal nodule,   consistent with adenoma. Low-density thickening of right adrenal gland noted,   without focal nodularity. BONES: No destructive bone lesion. CXR Results  (Last 48 hours)                 04/14/23 1046  XR CHEST PORT Final result    Impression:  1. 3.4 x 2.9 cm mass in left mid to upper lung. 2. Nonspecific patchy left basilar pulmonary densities. 3. Small left pleural effusion. Narrative:  EXAM: XR CHEST PORT       INDICATION: cough , hx o COPD       COMPARISON: None. FINDINGS: A portable AP radiograph of the chest was obtained at 1039 hours.         A left mid to upper lung mass is demonstrated measuring 3.4 cm transverse and   2.9 cc in craniocaudal. There is a small left pleural effusion. Trace right   pleural fluid is shown. Patchy densities are noted at the left pulmonary base   which are nonspecific, and could be on the basis of assessment of atelectasis or   pneumonia. Cardiac size is normal. Mild thoracic aortic tortuosity is shown   without other evidence for mediastinal or hilar enlargement. The bones are   moderately osteopenic with posterior neck deformity demonstrated in the left   humeral head. .                        Medical Decision Making   I am the first provider for this patient. I reviewed the vital signs, available nursing notes, past medical history, past surgical history, family history and social history. Records Reviewed: Prior medical records    Provider Notes (Medical Decision Making):   MDM: 59-year-old female with cough. Here she has a normal O2 sat at rest, appears cachectic and chronically ill. Chest x-ray reveals possible mass on lung. Will obtain CT scan. Nebulizer treatment here for symptomatic relief. Unclear disposition pending results of testing. Initial assessment performed. The patients presenting problems have been discussed, and they are in agreement with the care plan formulated and outlined with them. I have encouraged them to ask questions as they arise throughout their visit. PROGRESS NOTE:   CT scan reveals lung mass highly suspicious for cancer with changes in left lower lobe suspicious for malignancy versus infection. We will treat as COPD exacerbation versus pneumonia now. Antibiotics and steroids sent to patient's pharmacy. We discussed the importance of follow-up with an oncologist for a further evaluation, patient voiced understanding of plan. Discharge note:  Pt re-evaluated and noted to be feeling better, ready for discharge. Updated pt on all final results. Will follow up as instructed. All questions have been answered, pt voiced understanding and agreement with plan.  Specific return precautions provided as well as instructions to return to the ED should sx worsen at any time. Vital signs stable for discharge. Diagnosis     Clinical Impression:   1. Lung mass    2. Acute exacerbation of chronic obstructive pulmonary disease (COPD) ([facility] [address]            Disposition:  Discharged    Discharge Medication List as of 4/14/2023 12:55 PM        START taking these medications    Details   predniSONE (DELTASONE) 20 mg tablet Take 2 Tablets by mouth daily for 5 days. With Breakfast, Normal, Disp-10 Tablet, R-0      azithromycin (Zithromax Z-Prabhu) 250 mg tablet Use as directed on packaging, Normal, Disp-6 Tablet, R-0      amoxicillin-clavulanate (Augmentin) 875-125 mg per tablet Take 1 Tablet by mouth two (2) times a day., Normal, Disp-14 Tablet, R-0           CONTINUE these medications which have NOT CHANGED    Details   carvediloL (COREG) 25 mg tablet TAKE 1 TABLET BY MOUTH TWICE A DAY WITH MEALS, Normal, Disp-180 Tablet, R-0      nitroglycerin (NITROSTAT) 0.4 mg SL tablet 1 Tablet by SubLINGual route every five (5) minutes as needed for Chest Pain. Up to 3 doses. , Normal, Disp-20 Tablet, R-5      albuterol (PROVENTIL HFA, VENTOLIN HFA, PROAIR HFA) 90 mcg/actuation inhaler Take 2 Puffs by inhalation every four (4) hours as needed for Wheezing., Normal, Disp-18 g, R-3      aspirin delayed-release 81 mg tablet Take  by mouth daily. , Historical Med      LORazepam (ATIVAN) 0.5 mg tablet Take 0.5 mg by mouth., Historical Med      co-enzyme Q-10 (CO Q-10) 100 mg capsule Take 100 mg by mouth daily. , Historical Med      cyanocobalamin (VITAMIN B12) 250 mcg tablet Take 250 mcg by mouth daily. , Historical Med      atorvastatin (LIPITOR) 40 mg tablet Take 1 Tablet by mouth daily. , Normal, Disp-90 Tablet, R-1      fluticasone propion-salmeteroL (Advair HFA) 115-21 mcg/actuation inhaler Take 2 Puffs by inhalation two (2) times a day., Normal, Disp-12 g, R-5      clopidogreL (PLAVIX) 75 mg tab Take 1 Tablet by mouth daily. , Normal, Disp-90 Tablet, R-1      gabapentin (NEURONTIN) 300 mg capsule Take 1 Capsule by mouth three (3) times daily. Max Daily Amount: 900 mg., Normal, Disp-270 Capsule, R-1      ipratropium (Atrovent HFA) 17 mcg/actuation inhaler Take 2 Puffs by inhalation every four (4) hours as needed for Wheezing., Normal, Disp-12.9 g, R-5      lisinopriL (PRINIVIL, ZESTRIL) 5 mg tablet Take 1 Tablet by mouth daily. , Normal, Disp-90 Tablet, R-1               Please note, this dictation was completed with [employer], the [employer] voice recognition software. Quite often unanticipated grammatical, syntax, homophones, and other interpretive errors are inadvertently transcribed by the computer software. Please disregard these errors. Please excuse any errors that have escaped final proof reading.

## 2023-04-21 ENCOUNTER — HOSPITAL ENCOUNTER (OUTPATIENT)
Dept: BEHAVIORAL/MENTAL HEALTH | Age: 68
Discharge: HOME | End: 2023-04-21

## 2023-04-21 DIAGNOSIS — F41.1 GENERALIZED ANXIETY DISORDER WITH PANIC ATTACKS: Primary | ICD-10-CM

## 2023-04-21 DIAGNOSIS — F51.05 INSOMNIA DUE TO MENTAL CONDITION: ICD-10-CM

## 2023-04-21 DIAGNOSIS — F41.0 GENERALIZED ANXIETY DISORDER WITH PANIC ATTACKS: Primary | ICD-10-CM

## 2023-04-21 DIAGNOSIS — F32.9 MAJOR DEPRESSIVE DISORDER WITHOUT PSYCHOTIC FEATURES: ICD-10-CM

## 2023-04-21 PROBLEM — F40.9 INSOMNIA DUE TO ANXIETY AND FEAR: Status: ACTIVE | Noted: 2023-04-21

## 2023-04-21 RX ORDER — LORAZEPAM 1 MG/1
1 TABLET ORAL
Qty: 60 TABLET | Refills: 0 | Status: SHIPPED | OUTPATIENT
Start: 2023-04-21 | End: 2023-05-21

## 2023-04-21 RX ORDER — TRAZODONE HYDROCHLORIDE 50 MG/1
50 TABLET ORAL
Qty: 30 TABLET | Refills: 0 | Status: SHIPPED | OUTPATIENT
Start: 2023-04-21 | End: 2023-05-21

## 2023-04-24 ENCOUNTER — OFFICE VISIT (OUTPATIENT)
Dept: ONCOLOGY | Age: 68
End: 2023-04-24
Payer: MEDICARE

## 2023-04-24 ENCOUNTER — TRANSCRIBE ORDERS (OUTPATIENT)
Facility: HOSPITAL | Age: 68
End: 2023-04-24

## 2023-04-24 ENCOUNTER — HOSPITAL ENCOUNTER (OUTPATIENT)
Dept: INFUSION THERAPY | Age: 68
Discharge: HOME | End: 2023-04-24
Payer: MEDICARE

## 2023-04-24 VITALS
OXYGEN SATURATION: 96 % | BODY MASS INDEX: 17.26 KG/M2 | WEIGHT: 97.4 LBS | SYSTOLIC BLOOD PRESSURE: 116 MMHG | RESPIRATION RATE: 18 BRPM | HEART RATE: 64 BPM | HEIGHT: 63 IN | DIASTOLIC BLOOD PRESSURE: 73 MMHG

## 2023-04-24 DIAGNOSIS — R91.8 MASS OF LEFT LUNG: Primary | ICD-10-CM

## 2023-04-24 DIAGNOSIS — R91.8 LUNG MASS: Primary | ICD-10-CM

## 2023-04-24 DIAGNOSIS — R91.8 MASS OF LEFT LUNG: ICD-10-CM

## 2023-04-24 LAB
ALBUMIN SERPL-MCNC: 2.8 G/DL (ref 3.5–5)
ALBUMIN/GLOB SERPL: 0.6 (ref 1.1–2.2)
ALP SERPL-CCNC: 148 U/L (ref 45–117)
ALT SERPL-CCNC: 76 U/L (ref 12–78)
ANION GAP SERPL CALC-SCNC: 10 MMOL/L (ref 5–15)
AST SERPL-CCNC: 43 U/L (ref 15–37)
BASOPHILS # BLD: 0 K/UL (ref 0–0.1)
BASOPHILS NFR BLD: 0 % (ref 0–1)
BILIRUB SERPL-MCNC: 0.9 MG/DL (ref 0.2–1)
BUN SERPL-MCNC: 12 MG/DL (ref 6–20)
BUN/CREAT SERPL: 22 (ref 12–20)
CALCIUM SERPL-MCNC: 9 MG/DL (ref 8.5–10.1)
CHLORIDE SERPL-SCNC: 101 MMOL/L (ref 97–108)
CO2 SERPL-SCNC: 28 MMOL/L (ref 21–32)
CREAT SERPL-MCNC: 0.54 MG/DL (ref 0.55–1.02)
DIFFERENTIAL METHOD BLD: ABNORMAL
EOSINOPHIL # BLD: 0 K/UL (ref 0–0.4)
EOSINOPHIL NFR BLD: 0 % (ref 0–7)
ERYTHROCYTE [DISTWIDTH] IN BLOOD BY AUTOMATED COUNT: 14.7 % (ref 11.5–14.5)
GLOBULIN SER CALC-MCNC: 4.6 G/DL (ref 2–4)
GLUCOSE SERPL-MCNC: 114 MG/DL (ref 65–100)
HCT VFR BLD AUTO: 35.2 % (ref 35–47)
HGB BLD-MCNC: 11.5 G/DL (ref 11.5–16)
IMM GRANULOCYTES # BLD AUTO: 0.1 K/UL (ref 0–0.04)
IMM GRANULOCYTES NFR BLD AUTO: 1 % (ref 0–0.5)
LYMPHOCYTES # BLD: 2.6 K/UL (ref 0.8–3.5)
LYMPHOCYTES NFR BLD: 25 % (ref 12–49)
MCH RBC QN AUTO: 29 PG (ref 26–34)
MCHC RBC AUTO-ENTMCNC: 32.7 G/DL (ref 30–36.5)
MCV RBC AUTO: 88.7 FL (ref 80–99)
MONOCYTES # BLD: 0.7 K/UL (ref 0–1)
MONOCYTES NFR BLD: 7 % (ref 5–13)
NEUTS SEG # BLD: 7.1 K/UL (ref 1.8–8)
NEUTS SEG NFR BLD: 67 % (ref 32–75)
NRBC # BLD: 0 K/UL (ref 0–0.01)
NRBC BLD-RTO: 0 PER 100 WBC
PLATELET # BLD AUTO: 366 K/UL (ref 150–400)
PMV BLD AUTO: 9.3 FL (ref 8.9–12.9)
POTASSIUM SERPL-SCNC: 4 MMOL/L (ref 3.5–5.1)
PROT SERPL-MCNC: 7.4 G/DL (ref 6.4–8.2)
RBC # BLD AUTO: 3.97 M/UL (ref 3.8–5.2)
SODIUM SERPL-SCNC: 139 MMOL/L (ref 136–145)
WBC # BLD AUTO: 10.5 K/UL (ref 3.6–11)

## 2023-04-24 PROCEDURE — 80053 COMPREHEN METABOLIC PANEL: CPT

## 2023-04-24 PROCEDURE — 3078F DIAST BP <80 MM HG: CPT | Performed by: INTERNAL MEDICINE

## 2023-04-24 PROCEDURE — 85025 COMPLETE CBC W/AUTO DIFF WBC: CPT

## 2023-04-24 PROCEDURE — 36415 COLL VENOUS BLD VENIPUNCTURE: CPT

## 2023-04-24 PROCEDURE — 1123F ACP DISCUSS/DSCN MKR DOCD: CPT | Performed by: INTERNAL MEDICINE

## 2023-04-24 PROCEDURE — 3074F SYST BP LT 130 MM HG: CPT | Performed by: INTERNAL MEDICINE

## 2023-04-24 PROCEDURE — 99205 OFFICE O/P NEW HI 60 MIN: CPT | Performed by: INTERNAL MEDICINE

## 2023-04-24 NOTE — PROGRESS NOTES
[facility]  Medical Oncology at [place]  [phone]    Hematology / Oncology Consult    Reason for Visit:   Rico Hitchcock is a 70 y.o. female who is seen in consultation at the request the patient for a left lung mass    History of Present Illness:   Dear colleague,       It was my great pleasure to see Rico Hitchcock in consultation on the medical oncology and hematology service on 24 April 2023. As you recall this is a kind 69-year-old female who carries a past medical history noteworthy for a lung malignancy involving the left upper lobe dating back to 2009. The patient would undergo a thoracotomy and resection. The extent of the resection is uncertain although a left upper lobe lobectomy was noted from the past medical history. Whether this was truly a lobectomy versus a segmentectomy/wedge resection is uncertain unfortunately the past medical record does not accompany the patient today. The patient's history is also noteworthy for 50-pack-year tobacco history with accompanying COPD. She has coronary artery disease and is status postmyocardial infarction in 2005. She has 2 stents that remain in place. Her history is noteworthy for hypertension. Over the course of the last year the patient has developed progressive weakness and asthenia. More recently she would develop cough. A recent chest x-ray revealed a left upper lobe mass lesion a subsequent CT imaging study confirm a greater than 3 cm what appears to be a primary dominant mass. Multiple by lobar infiltrates were noted the patient did have great improvement with the deployment of antimicrobials. She states that she is losing weight in spite of having an appetite. She denies any agatha cephalgia or changes in visual acuity she is having no lateralizing signs or symptoms neurologically. She offers no complaints of change in the quality or character of her voice or any significant hemoptysis.   She does have baseline dyspnea which is unchanged. She has continued to utilize tobacco extensively. She is utilizing 1 pack/day at present. The patient does have a history of exposure to asbestos. She denies any dyspepsia, dysphagia or odynophagia. She has had complaints of pain in her left side more recently that has been intermittent she denies any back pain or new soft tissue masses over and above baseline. She reports no B symptoms including fevers, night sweats although she does have weight loss as noted. Her ECOG performance status is 1 the patient wishes to have all medical information only provided to her. She is specifically requested that it not be discussed with her family. Past Medical History:   Diagnosis Date    Coronary artery disease involving native coronary artery     AMI 2005    Essential hypertension 11/18/2022    Hx of cancer of lung 2009    ILYA lobectomy    Mixed type COPD (chronic obstructive pulmonary disease) ([facility] [address] 11/18/2022    Tobacco use disorder, continuous       Past Surgical History:   Procedure Laterality Date    HX APPENDECTOMY N/A     HX CHOLECYSTECTOMY  2009    HX LOBECTOMY Left 2009    ILYA    HX LUMBAR LAMINECTOMY  1998    HX PTCA  2005    Stent x 2    HX TUBAL LIGATION        Social History     Tobacco Use    Smoking status: Every Day     Packs/day: 1.00     Years: 53.00     Pack years: 53.00     Types: Cigarettes    Smokeless tobacco: Never   Substance Use Topics    Alcohol use: Not Currently     Alcohol/week: 2.0 standard drinks     Types: 2 Cans of beer per week      Family History   Problem Relation Age of Onset    No Known Problems Mother     No Known Problems Father     No Known Problems Maternal Grandmother     No Known Problems Maternal Grandfather     No Known Problems Paternal Grandmother     No Known Problems Paternal Grandfather      Current Outpatient Medications   Medication Sig    multivitamin with minerals (ONE-A-DAY 50 PLUS PO) Take  by mouth.     cholecalciferol, vitamin D3, (VITAMIN D3 PO) Take  by mouth. LORazepam (ATIVAN) 1 mg tablet Take 1 Tablet by mouth two (2) times daily as needed for Anxiety for up to 30 days. Max Daily Amount: 2 mg. Indications: anxious    traZODone (DESYREL) 50 mg tablet Take 1 Tablet by mouth nightly for 30 days. Indications: insomnia associated with depression    carvediloL (COREG) 25 mg tablet TAKE 1 TABLET BY MOUTH TWICE A DAY WITH MEALS    nitroglycerin (NITROSTAT) 0.4 mg SL tablet 1 Tablet by SubLINGual route every five (5) minutes as needed for Chest Pain. Up to 3 doses. albuterol (PROVENTIL HFA, VENTOLIN HFA, PROAIR HFA) 90 mcg/actuation inhaler Take 2 Puffs by inhalation every four (4) hours as needed for Wheezing. aspirin delayed-release 81 mg tablet Take  by mouth daily. co-enzyme Q-10 (CO Q-10) 100 mg capsule Take 1 Capsule by mouth daily. cyanocobalamin (VITAMIN B12) 250 mcg tablet Take 1 Tablet by mouth daily. atorvastatin (LIPITOR) 40 mg tablet Take 1 Tablet by mouth daily. fluticasone propion-salmeteroL (Advair HFA) 115-21 mcg/actuation inhaler Take 2 Puffs by inhalation two (2) times a day. clopidogreL (PLAVIX) 75 mg tab Take 1 Tablet by mouth daily. gabapentin (NEURONTIN) 300 mg capsule Take 1 Capsule by mouth three (3) times daily. Max Daily Amount: 900 mg.    ipratropium (Atrovent HFA) 17 mcg/actuation inhaler Take 2 Puffs by inhalation every four (4) hours as needed for Wheezing. lisinopriL (PRINIVIL, ZESTRIL) 5 mg tablet Take 1 Tablet by mouth daily. No current facility-administered medications for this visit. Allergies   Allergen Reactions    Sulfa (Sulfonamide Antibiotics) Hives        Review of Systems: A complete review of systems was obtained, negative except as described above.   A 14 point review of systems is performed pertinent positives and negatives are addressed within the body of the history of present illness        Physical Exam:   Visit Vitals  /73 (BP 1 Location: Left upper arm, BP Patient Position: Sitting)   Pulse 64   Resp 18   Ht 5' 3\" (1.6 m)   Wt 97 lb 6.4 oz (44.2 kg)   SpO2 96%   BMI 17.25 kg/m²     ECOG PS:   General: No distress  Eyes: PERRLA, anicteric sclerae  HENT: Atraumatic with normal appearance of ears and nose; OP clear  Neck: Supple; no thyromegaly   Lymphatic: No cervical, supraclavicular, or axillary adenopathy  Respiratory: CTAB, normal respiratory effort  CV: Normal rate, regular rhythm, no murmurs, no peripheral edema  GI: Soft, nontender, nondistended, no masses, no hepatomegaly, no splenomegaly  MS: Normal gait and station. Digits without clubbing or cyanosis. Skin: No rashes, ecchymoses, or petechiae. Normal temperature, turgor, and texture. Jay/Psych: Alert, oriented, appropriate affect, normal judgment/insight  Addendum to my physical examination this is a thin cachectic appearing female with temporal wasting bilaterally stable vital signs afebrile HEENT the pharynx is clear there is no mucositis or thrush the tongue has leukoplakia that is black.   There are no oral mucosal lesions on direct examination the neck is supple no JVD bruit or thyromegaly no thyroid nodules the chest fair air exchange occasional soft wheeze no rales no rhonchi heart regular rate and rhythm no S3 the abdomen is soft nontender no spleen tip no hepatomegaly no masses no guarding or rebound no shifting dullness or fluid wave extremities no clubbing cyanosis or edema all corbin stations are assessed no evidence of discrete or demonstrable adenopathy Waldeyer's ring is none hypertrophied    Results:   No results found for: WBC, HGB, HCT, PLT, MCV, ANEU, HGBPOC, HCTPOC, HGBEXT, HCTEXT, PLTEXT  No results found for: NA, K, CL, CO2, GLU, BUN, CREA, GFRAA, GFRNA, CA, NAPOC, KPOCT, CLPOC, GLUCPOC, IBUN, CREAPOC, ICAI  No results found for: TBILI, ALT, AP, TP, ALB, GLOB        Images:    @CARDRESULT([id number])  CT CHEST WO CONT  Narrative: INDICATION: Abnormal chest x-ray    COMPARISON: Chest radiograph 4/14/2023    CONTRAST: None. TECHNIQUE:  5 mm axial images were obtained through the chest. Coronal and  sagittal reformats were generated. CT dose reduction was achieved through use  of a standardized protocol tailored for this examination and automatic exposure  control for dose modulation. The absence of intravenous contrast reduces the sensitivity for evaluation of  the mediastinum, luis, vasculature, and upper abdominal organs. FINDINGS:    CHEST WALL: No mass or axillary lymphadenopathy. THYROID: No nodule. MEDIASTINUM: No mass or lymphadenopathy. LUIS: No mass or lymphadenopathy. THORACIC AORTA: No aneurysm. MAIN PULMONARY ARTERY: Normal in caliber. TRACHEA/BRONCHI: Patent. ESOPHAGUS: No wall thickening or dilatation. HEART: Normal in size. Coronary artery calcium: absent  PLEURA: No effusion or pneumothorax. LUNGS: 3.3 x 3.0 x 2.2 cm mass in the left upper lobe. There is multifocal  consolidation in the left lower lobe and to a lesser extent lingula and right  middle lobe. INCIDENTALLY IMAGED UPPER ABDOMEN: 2 cm low-density left adrenal nodule,  consistent with adenoma. Low-density thickening of right adrenal gland noted,  without focal nodularity. BONES: No destructive bone lesion. Impression: 1.  3.3 x 3.0 cm mass in the left upper lobe, highly concerning for malignancy. Mass is amenable to percutaneous biopsy. 2.  Multifocal consolidations in the left lower lobe, lingula, and right middle  lobe. These may represent pneumonia versus metastatic disease. XR CHEST PORT  Narrative: EXAM: XR CHEST PORT    INDICATION: cough , hx o COPD    COMPARISON: None. FINDINGS: A portable AP radiograph of the chest was obtained at 1039 hours. A left mid to upper lung mass is demonstrated measuring 3.4 cm transverse and  2.9 cc in craniocaudal. There is a small left pleural effusion. Trace right  pleural fluid is shown.  Patchy densities are noted at the left pulmonary base  which are nonspecific, and could be on the basis of assessment of atelectasis or  pneumonia. Cardiac size is normal. Mild thoracic aortic tortuosity is shown  without other evidence for mediastinal or hilar enlargement. The bones are  moderately osteopenic with posterior neck deformity demonstrated in the left  humeral head. .   Impression: 1. 3.4 x 2.9 cm mass in left mid to upper lung. 2. Nonspecific patchy left basilar pulmonary densities. 3. Small left pleural effusion. This CT imaging study was formally reviewed by me     Assessment and Recommendations:           1. Previous history of presumptive pulmonary malignancy dating back to 2009      2. New diagnosis of a left lung mass lesion accompanied clinically by progressive cough with probable postobstructive component, fatigue and profound weight loss    My impression is that of a delightful 69-year-old female with a past medical history noteworthy for COPD, coronary artery disease and hypertension who has a past history noteworthy for a bronchogenic neoplasm dating back to 2009 status postresection presenting now with worsening fatigue asthenia, cough and multilevel lobar pneumonia/infiltrate with a left lung mass lesion measuring greater than 3.4 x 2.9 cm. Today over the course of a 1 hour discussion and examination with the patient I have outlined for her what I believed to be an appropriate direction of care in light of her past medical history with a previous lung malignancy, her continued tobacco exposure and the clinical and radiographic constellation of signs and symptoms I suspect a second primary bronchogenic neoplasm. Certainly given her history either non-small cell or small cell malignancies should be considered. Today over the course of our clinic visit we discussed the need to stage the patient appropriately and obtain a histologic confirmation of malignant diagnosis.   Also noteworthy will be to obtain adequate tissue such that next generation sequencing can adequately be performed. For today routine laboratory studies will be obtained including a CBC with differential and CMP. I have requested a pet imaging study for formal staging as well as an MRI of the brain. Once these studies have been procured then tissue diagnosis/sampling will be pursued. A biopsy site that will provide staging, prognosis is well as histologic confirmation and the ability to test on a molecular level will be critical.  I will see the patient back in clinic once the studies have been completed hopefully within 10 days time. At that juncture a preliminary direction of care could be outlined for the patient in terms of how best to obtain tissue. I have encouraged the patient to increase the caloric density in her diet. Her pain scores at this juncture are essentially 0. Thank you for allowing us to participate in the care of this delightful woman      [facility].  Bo Figueroa MD mph    Thank you for allowing me to participate in the care of this kind individual.      Signed By: Mitchell Olivier MD     April 24, 2023

## 2023-04-24 NOTE — PROGRESS NOTES
Manuela Blankenship is a 70 y.o. female  Chief Complaint   Patient presents with    New Patient    Lung Cancer     1. Have you been to the ER, urgent care clinic since your last visit? Hospitalized since your last visit? No    2. Have you seen or consulted any other health care providers outside of the [address] [place] [address] since your last visit? Include any pap smears or colon screening.   [address] [place] [address] - Dr. Courtney Samson - took upper left lobe out - 2009

## 2023-05-03 ENCOUNTER — OFFICE VISIT (OUTPATIENT)
Dept: ONCOLOGY | Age: 68
End: 2023-05-03
Payer: MEDICARE

## 2023-05-03 DIAGNOSIS — R91.8 MASS OF LEFT LUNG: Primary | ICD-10-CM

## 2023-05-03 PROCEDURE — 99214 OFFICE O/P EST MOD 30 MIN: CPT | Performed by: INTERNAL MEDICINE

## 2023-05-03 PROCEDURE — 1123F ACP DISCUSS/DSCN MKR DOCD: CPT | Performed by: INTERNAL MEDICINE

## 2023-05-04 ENCOUNTER — HOSPITAL ENCOUNTER (OUTPATIENT)
Dept: PET IMAGING | Age: 68
End: 2023-05-04
Attending: INTERNAL MEDICINE
Payer: MEDICARE

## 2023-05-04 ENCOUNTER — HOSPITAL ENCOUNTER (OUTPATIENT)
Dept: MRI IMAGING | Age: 68
Discharge: HOME | End: 2023-05-04
Attending: INTERNAL MEDICINE
Payer: MEDICARE

## 2023-05-04 VITALS — WEIGHT: 100 LBS | BODY MASS INDEX: 17.71 KG/M2

## 2023-05-04 DIAGNOSIS — R91.8 MASS OF LEFT LUNG: ICD-10-CM

## 2023-05-04 LAB
GLUCOSE BLD STRIP.AUTO-MCNC: 102 MG/DL (ref 65–117)
SERVICE CMNT-IMP: NORMAL

## 2023-05-04 PROCEDURE — A9576 INJ PROHANCE MULTIPACK: HCPCS | Performed by: INTERNAL MEDICINE

## 2023-05-04 PROCEDURE — A9552 F18 FDG: HCPCS

## 2023-05-04 PROCEDURE — 74011250636 HC RX REV CODE- 250/636: Performed by: INTERNAL MEDICINE

## 2023-05-04 PROCEDURE — 70553 MRI BRAIN STEM W/O & W/DYE: CPT

## 2023-05-04 RX ORDER — FLUDEOXYGLUCOSE F-18 200 MCI/ML
10 INJECTION INTRAVENOUS ONCE
Status: COMPLETED | OUTPATIENT
Start: 2023-05-04 | End: 2023-05-04

## 2023-05-04 RX ADMIN — GADOTERIDOL 9 ML: 279.3 INJECTION, SOLUTION INTRAVENOUS at 12:12

## 2023-05-04 RX ADMIN — FLUDEOXYGLUCOSE F-18 10 MILLICURIE: 200 INJECTION INTRAVENOUS at 09:08

## 2023-05-08 ENCOUNTER — OFFICE VISIT (OUTPATIENT)
Age: 68
End: 2023-05-08
Payer: MEDICARE

## 2023-05-08 ENCOUNTER — HOSPITAL ENCOUNTER (OUTPATIENT)
Facility: HOSPITAL | Age: 68
Setting detail: INFUSION SERIES
End: 2023-05-08
Payer: MEDICARE

## 2023-05-08 ENCOUNTER — TELEPHONE (OUTPATIENT)
Age: 68
End: 2023-05-08

## 2023-05-08 VITALS
HEART RATE: 73 BPM | TEMPERATURE: 98 F | DIASTOLIC BLOOD PRESSURE: 69 MMHG | SYSTOLIC BLOOD PRESSURE: 109 MMHG | RESPIRATION RATE: 16 BRPM | BODY MASS INDEX: 17.15 KG/M2 | OXYGEN SATURATION: 92 % | WEIGHT: 96.8 LBS | HEIGHT: 63 IN

## 2023-05-08 DIAGNOSIS — R58 BLEEDING: ICD-10-CM

## 2023-05-08 DIAGNOSIS — R91.8 MASS OF UPPER LOBE OF LEFT LUNG: Primary | ICD-10-CM

## 2023-05-08 DIAGNOSIS — R91.8 MASS OF UPPER LOBE OF LEFT LUNG: ICD-10-CM

## 2023-05-08 LAB
ALBUMIN SERPL-MCNC: 3.1 G/DL (ref 3.5–5)
ALBUMIN/GLOB SERPL: 0.7 (ref 1.1–2.2)
ALP SERPL-CCNC: 114 U/L (ref 45–117)
ALT SERPL-CCNC: 27 U/L (ref 12–78)
ANION GAP SERPL CALC-SCNC: 11 MMOL/L (ref 5–15)
APTT PPP: 27.4 SEC (ref 22.1–31)
AST SERPL-CCNC: 22 U/L (ref 15–37)
BASOPHILS # BLD: 0 K/UL (ref 0–0.1)
BASOPHILS NFR BLD: 0 % (ref 0–1)
BILIRUB SERPL-MCNC: 0.7 MG/DL (ref 0.2–1)
BUN SERPL-MCNC: 18 MG/DL (ref 6–20)
BUN/CREAT SERPL: 29 (ref 12–20)
CALCIUM SERPL-MCNC: 9.3 MG/DL (ref 8.5–10.1)
CHLORIDE SERPL-SCNC: 102 MMOL/L (ref 97–108)
CO2 SERPL-SCNC: 25 MMOL/L (ref 21–32)
CREAT SERPL-MCNC: 0.63 MG/DL (ref 0.55–1.02)
DIFFERENTIAL METHOD BLD: ABNORMAL
EOSINOPHIL # BLD: 0.1 K/UL (ref 0–0.4)
EOSINOPHIL NFR BLD: 1 % (ref 0–7)
ERYTHROCYTE [DISTWIDTH] IN BLOOD BY AUTOMATED COUNT: 15.3 % (ref 11.5–14.5)
GLOBULIN SER CALC-MCNC: 4.2 G/DL (ref 2–4)
GLUCOSE SERPL-MCNC: 101 MG/DL (ref 65–100)
HCT VFR BLD AUTO: 35.9 % (ref 35–47)
HGB BLD-MCNC: 11.5 G/DL (ref 11.5–16)
IMM GRANULOCYTES # BLD AUTO: 0 K/UL (ref 0–0.04)
IMM GRANULOCYTES NFR BLD AUTO: 1 % (ref 0–0.5)
INR PPP: 1 (ref 0.9–1.1)
LYMPHOCYTES # BLD: 3.5 K/UL (ref 0.8–3.5)
LYMPHOCYTES NFR BLD: 41 % (ref 12–49)
MCH RBC QN AUTO: 28.3 PG (ref 26–34)
MCHC RBC AUTO-ENTMCNC: 32 G/DL (ref 30–36.5)
MCV RBC AUTO: 88.2 FL (ref 80–99)
MONOCYTES # BLD: 0.7 K/UL (ref 0–1)
MONOCYTES NFR BLD: 8 % (ref 5–13)
NEUTS SEG # BLD: 4.3 K/UL (ref 1.8–8)
NEUTS SEG NFR BLD: 49 % (ref 32–75)
NRBC # BLD: 0 K/UL (ref 0–0.01)
NRBC BLD-RTO: 0 PER 100 WBC
PLATELET # BLD AUTO: 216 K/UL (ref 150–400)
PMV BLD AUTO: 10 FL (ref 8.9–12.9)
POTASSIUM SERPL-SCNC: 3.9 MMOL/L (ref 3.5–5.1)
PROT SERPL-MCNC: 7.3 G/DL (ref 6.4–8.2)
PROTHROMBIN TIME: 10.3 SEC (ref 9–11.1)
RBC # BLD AUTO: 4.07 M/UL (ref 3.8–5.2)
SODIUM SERPL-SCNC: 138 MMOL/L (ref 136–145)
THERAPEUTIC RANGE: NORMAL SECS (ref 58–77)
WBC # BLD AUTO: 8.7 K/UL (ref 3.6–11)

## 2023-05-08 PROCEDURE — 85025 COMPLETE CBC W/AUTO DIFF WBC: CPT

## 2023-05-08 PROCEDURE — 80053 COMPREHEN METABOLIC PANEL: CPT

## 2023-05-08 PROCEDURE — 99214 OFFICE O/P EST MOD 30 MIN: CPT | Performed by: INTERNAL MEDICINE

## 2023-05-08 PROCEDURE — 1123F ACP DISCUSS/DSCN MKR DOCD: CPT | Performed by: INTERNAL MEDICINE

## 2023-05-08 PROCEDURE — 36415 COLL VENOUS BLD VENIPUNCTURE: CPT

## 2023-05-08 PROCEDURE — 85610 PROTHROMBIN TIME: CPT

## 2023-05-08 PROCEDURE — 3074F SYST BP LT 130 MM HG: CPT | Performed by: INTERNAL MEDICINE

## 2023-05-08 PROCEDURE — 85730 THROMBOPLASTIN TIME PARTIAL: CPT

## 2023-05-08 PROCEDURE — 3078F DIAST BP <80 MM HG: CPT | Performed by: INTERNAL MEDICINE

## 2023-05-08 RX ORDER — DICLOXACILLIN SODIUM 250 MG/1
250 CAPSULE ORAL 4 TIMES DAILY
Qty: 28 CAPSULE | Refills: 0 | OUTPATIENT
Start: 2023-05-08 | End: 2023-05-08

## 2023-05-08 RX ORDER — LORAZEPAM 1 MG/1
1 TABLET ORAL 2 TIMES DAILY PRN
COMMUNITY
Start: 2023-04-21

## 2023-05-08 RX ORDER — DICLOXACILLIN SODIUM 250 MG/1
250 CAPSULE ORAL 4 TIMES DAILY
Qty: 28 CAPSULE | Refills: 0 | OUTPATIENT
Start: 2023-05-08 | End: 2023-05-18

## 2023-05-08 RX ORDER — TRAZODONE HYDROCHLORIDE 50 MG/1
1 TABLET ORAL NIGHTLY
COMMUNITY
Start: 2023-04-21

## 2023-05-08 ASSESSMENT — PATIENT HEALTH QUESTIONNAIRE - PHQ9
SUM OF ALL RESPONSES TO PHQ QUESTIONS 1-9: 0
SUM OF ALL RESPONSES TO PHQ QUESTIONS 1-9: 0
2. FEELING DOWN, DEPRESSED OR HOPELESS: 0
1. LITTLE INTEREST OR PLEASURE IN DOING THINGS: 0
SUM OF ALL RESPONSES TO PHQ QUESTIONS 1-9: 0
SUM OF ALL RESPONSES TO PHQ QUESTIONS 1-9: 0
SUM OF ALL RESPONSES TO PHQ9 QUESTIONS 1 & 2: 0

## 2023-05-08 NOTE — TELEPHONE ENCOUNTER
Patient has 1500 appointment with Dr Mario Gongora, we will discuss antibiotic today during his appointment.

## 2023-05-08 NOTE — PROGRESS NOTES
Ritchie Cochran is a 70 y.o. female here for follow up for History of NSCLC and new L lung mass lesion. Patient states she has a new pain in the left hip area, she describes pain as hot and dull. States this area has a new lump. She also is having problems with a tooth on the R side of her mouth, she is trying to get an appointment with a PCP to look after her. She moved to this area last October from [place].
pneumonia may indeed make this a stage Ia malignancy and therefore not amenable to neoadjuvant chemo immunotherapy. The patient will have a biopsy obtained and molecular will be sent for the standard panel including next generation sequencing and both CPS, PD-L1 and TMB expression. The patient will also require cardiac and pulmonary clearance if surgery is to be undertaken we will consult with our thoracic surgical colleagues. Past Medical History:   Diagnosis Date    Coronary artery disease involving native coronary artery     AMI 2005    Essential hypertension 11/18/2022    Hx of cancer of lung 2009    DANE lobectomy    Mixed type COPD (chronic obstructive pulmonary disease) ([facility] [address] 11/18/2022    Tobacco use disorder, continuous       Past Surgical History:   Procedure Laterality Date    APPENDECTOMY N/A     CHOLECYSTECTOMY  2009    LOBECTOMY Left 2009    DANE    LUMBAR LAMINECTOMY  1998    PTCA  2005    Stent x 2    TUBAL LIGATION        Social History     Tobacco Use    Smoking status: Every Day     Packs/day: 1.00     Years: 50.00     Pack years: 50.00     Types: Cigarettes     Passive exposure: Past    Smokeless tobacco: Never   Substance Use Topics    Alcohol use: Yes     Alcohol/week: 1.0 standard drink     Types: 1 Cans of beer per week      Family History   Problem Relation Age of Onset    Heart Attack Mother     Lung Cancer Father     Substance Abuse Brother         ETOH, CHF    Prostate Cancer Brother     Diabetes Maternal Grandmother     Heart Failure Maternal Grandfather     No Known Problems Paternal Grandmother     No Known Problems Paternal Grandfather     Brain Cancer Maternal Cousin      Current Outpatient Medications   Medication Sig    LORazepam (ATIVAN) 1 MG tablet Take 1 tablet by mouth 2 times daily as needed.     traZODone (DESYREL) 50 MG tablet Take 1 tablet by mouth at bedtime    aspirin 81 MG EC tablet Take by mouth daily    atorvastatin (LIPITOR) 40 MG tablet Take 1 tablet by mouth daily

## 2023-05-11 DIAGNOSIS — I10 ESSENTIAL (PRIMARY) HYPERTENSION: ICD-10-CM

## 2023-05-11 DIAGNOSIS — I25.10 ATHEROSCLEROTIC HEART DISEASE OF NATIVE CORONARY ARTERY WITHOUT ANGINA PECTORIS: ICD-10-CM

## 2023-05-11 RX ORDER — ATORVASTATIN CALCIUM 40 MG/1
TABLET, FILM COATED ORAL
Qty: 90 TABLET | Refills: 1 | Status: SHIPPED | OUTPATIENT
Start: 2023-05-11

## 2023-05-11 RX ORDER — LISINOPRIL 5 MG/1
TABLET ORAL
Qty: 90 TABLET | Refills: 1 | Status: SHIPPED | OUTPATIENT
Start: 2023-05-11

## 2023-05-11 NOTE — TELEPHONE ENCOUNTER
Requested Prescriptions     Pending Prescriptions Disp Refills    atorvastatin (LIPITOR) 40 MG tablet [Pharmacy Med Name: ATORVASTATIN 40 MG TABLET] 90 tablet 1     Sig: TAKE 1 TABLET BY MOUTH EVERY DAY    lisinopril (PRINIVIL;ZESTRIL) 5 MG tablet [Pharmacy Med Name: LISINOPRIL 5 MG TABLET] 90 tablet 1     Sig: TAKE 1 TABLET BY MOUTH EVERY DAY        Last refill:     Lisinopril on 11/18/22 for # 90 with 1 refill   Atorvastatin on 11/18/22 for #90 with 1 refill    Last seen:11/18/22 for DANYA

## 2023-05-16 ENCOUNTER — HOSPITAL ENCOUNTER (OUTPATIENT)
Facility: HOSPITAL | Age: 68
Discharge: HOME | End: 2023-05-19
Payer: MEDICAID

## 2023-05-16 VITALS
WEIGHT: 96 LBS | SYSTOLIC BLOOD PRESSURE: 105 MMHG | HEIGHT: 63 IN | RESPIRATION RATE: 18 BRPM | OXYGEN SATURATION: 97 % | DIASTOLIC BLOOD PRESSURE: 51 MMHG | BODY MASS INDEX: 17.01 KG/M2 | TEMPERATURE: 98.1 F | HEART RATE: 68 BPM

## 2023-05-16 DIAGNOSIS — R91.8 MASS OF UPPER LOBE OF LEFT LUNG: ICD-10-CM

## 2023-05-16 PROCEDURE — 2580000003 HC RX 258: Performed by: STUDENT IN AN ORGANIZED HEALTH CARE EDUCATION/TRAINING PROGRAM

## 2023-05-16 PROCEDURE — 88342 IMHCHEM/IMCYTCHM 1ST ANTB: CPT

## 2023-05-16 PROCEDURE — 88305 TISSUE EXAM BY PATHOLOGIST: CPT

## 2023-05-16 PROCEDURE — 88333 PATH CONSLTJ SURG CYTO XM 1: CPT

## 2023-05-16 PROCEDURE — 99156 MOD SED OTH PHYS/QHP 5/>YRS: CPT

## 2023-05-16 PROCEDURE — 71045 X-RAY EXAM CHEST 1 VIEW: CPT

## 2023-05-16 PROCEDURE — 6360000002 HC RX W HCPCS: Performed by: STUDENT IN AN ORGANIZED HEALTH CARE EDUCATION/TRAINING PROGRAM

## 2023-05-16 PROCEDURE — 88341 IMHCHEM/IMCYTCHM EA ADD ANTB: CPT

## 2023-05-16 RX ORDER — MIDAZOLAM HYDROCHLORIDE 1 MG/ML
5 INJECTION, SOLUTION INTRAMUSCULAR; INTRAVENOUS
Status: DISCONTINUED | OUTPATIENT
Start: 2023-05-16 | End: 2023-05-16

## 2023-05-16 RX ORDER — SODIUM CHLORIDE 9 MG/ML
INJECTION, SOLUTION INTRAVENOUS CONTINUOUS
Status: DISCONTINUED | OUTPATIENT
Start: 2023-05-16 | End: 2023-05-16

## 2023-05-16 RX ORDER — FENTANYL CITRATE 50 UG/ML
25 INJECTION, SOLUTION INTRAMUSCULAR; INTRAVENOUS
Status: DISCONTINUED | OUTPATIENT
Start: 2023-05-16 | End: 2023-05-16

## 2023-05-16 RX ADMIN — FENTANYL CITRATE 25 MCG: 50 INJECTION, SOLUTION INTRAMUSCULAR; INTRAVENOUS at 11:55

## 2023-05-16 RX ADMIN — MIDAZOLAM 1 MG: 1 INJECTION INTRAMUSCULAR; INTRAVENOUS at 12:00

## 2023-05-16 RX ADMIN — MIDAZOLAM 1 MG: 1 INJECTION INTRAMUSCULAR; INTRAVENOUS at 11:50

## 2023-05-16 RX ADMIN — MIDAZOLAM 5 MG: 1 INJECTION INTRAMUSCULAR; INTRAVENOUS at 11:55

## 2023-05-16 RX ADMIN — FENTANYL CITRATE 25 MCG: 50 INJECTION, SOLUTION INTRAMUSCULAR; INTRAVENOUS at 11:50

## 2023-05-16 RX ADMIN — SODIUM CHLORIDE: 9 INJECTION, SOLUTION INTRAVENOUS at 12:05

## 2023-05-16 RX ADMIN — FENTANYL CITRATE 25 MCG: 50 INJECTION, SOLUTION INTRAMUSCULAR; INTRAVENOUS at 11:45

## 2023-05-16 RX ADMIN — MIDAZOLAM 1 MG: 1 INJECTION INTRAMUSCULAR; INTRAVENOUS at 11:45

## 2023-05-16 ASSESSMENT — PAIN - FUNCTIONAL ASSESSMENT: PAIN_FUNCTIONAL_ASSESSMENT: NONE - DENIES PAIN

## 2023-05-16 NOTE — DISCHARGE INSTRUCTIONS
[address]  Special Procedures/Radiology Department    Radiologist:    Dora Briggs, Severiano Pineda MD    Date:    05/15/2023    Biopsy Discharge Instructions    You may have an aching pain in the biopsy site tonight. You may Tylenol, as directed on the label, for pain or discomfort. Avoid ibuprofen (Advil, Motrin) and aspirin for the next 48 hours as these drugs may cause you to bleed. Watch for bleeding from the biopsy site. Hold pressure to the area for at least 15 minutes if bleeding does occur. If you have severe pain or swelling at the site, go directly to the nearest Emergency Room. Watch for signs of infection:  redness, pain, pus, drainage, fever or chills. If this occurs, call your doctor. Resume your previous diet and follow the medication reconciliation form. Rest the remainder of today. Do not lift anything heavier than a small grocery bag (10 pounds) for the next 5 days. It may take up to 3 days for your test results to become available to your physician. Call your physician if you have not heard anything after 3 business day. If you have any questions or concerns, please call [phone] and ask to speak to the nurse on-call.

## 2023-05-16 NOTE — PROGRESS NOTES
1000: Patient arrived in [facility] recovery area A&Ox4 on RA in NAD at this time. 1350: Post lung biopsy CXR complete. Per Dr. Leesa Winkler showed no signs of pnuemothorax post biopsy. Reviewed discharge instructions with patient. Opportunity for questions and clarification given. Patient verbalized understanding. Patient wheeled out to meet transportation home.

## 2023-05-16 NOTE — PROGRESS NOTES
Name of Procedure: Left lung biopsy     Sedation medications given:      Versed: 4mg     Fentanyl:  75mcg     Sedation Tolerated: well     Total Sedation Time: 30 minutes     Sedation Start: 1145  Sedation End: 1215     Vital Signs:  VSS throughout     Samples sent to lab: samples sent for cytology     Any complications related to procedure: none     Patient is A&Ox4, on RA, and is in NAD at this time.

## 2023-05-22 ENCOUNTER — OFFICE VISIT (OUTPATIENT)
Age: 68
End: 2023-05-22
Payer: MEDICAID

## 2023-05-22 ENCOUNTER — HOSPITAL ENCOUNTER (OUTPATIENT)
Facility: HOSPITAL | Age: 68
Discharge: HOME | End: 2023-05-25
Payer: MEDICAID

## 2023-05-22 VITALS — WEIGHT: 100.2 LBS | BODY MASS INDEX: 17.75 KG/M2 | HEIGHT: 63 IN | RESPIRATION RATE: 16 BRPM

## 2023-05-22 DIAGNOSIS — F41.1 GENERALIZED ANXIETY DISORDER WITH PANIC ATTACKS: Primary | ICD-10-CM

## 2023-05-22 DIAGNOSIS — C34.92 NON-SMALL CELL CANCER OF LEFT LUNG (HCC): ICD-10-CM

## 2023-05-22 DIAGNOSIS — E44.1 MILD PROTEIN-CALORIE MALNUTRITION (HCC): ICD-10-CM

## 2023-05-22 DIAGNOSIS — F51.05 INSOMNIA DUE TO ANXIETY AND FEAR: ICD-10-CM

## 2023-05-22 DIAGNOSIS — Z85.118 HX OF CANCER OF LUNG: Primary | ICD-10-CM

## 2023-05-22 DIAGNOSIS — F40.9 INSOMNIA DUE TO ANXIETY AND FEAR: ICD-10-CM

## 2023-05-22 DIAGNOSIS — F41.0 GENERALIZED ANXIETY DISORDER WITH PANIC ATTACKS: Primary | ICD-10-CM

## 2023-05-22 PROBLEM — E46 PROTEIN CALORIE MALNUTRITION (HCC): Status: ACTIVE | Noted: 2023-05-22

## 2023-05-22 PROCEDURE — 99214 OFFICE O/P EST MOD 30 MIN: CPT | Performed by: INTERNAL MEDICINE

## 2023-05-22 PROCEDURE — 99213 OFFICE O/P EST LOW 20 MIN: CPT | Performed by: MIDWIFE

## 2023-05-22 PROCEDURE — 1123F ACP DISCUSS/DSCN MKR DOCD: CPT | Performed by: INTERNAL MEDICINE

## 2023-05-22 RX ORDER — LORAZEPAM 1 MG/1
1 TABLET ORAL 2 TIMES DAILY PRN
Qty: 60 TABLET | Refills: 0 | Status: SHIPPED | OUTPATIENT
Start: 2023-05-22 | End: 2023-06-21

## 2023-05-22 RX ORDER — TRAZODONE HYDROCHLORIDE 50 MG/1
75 TABLET ORAL NIGHTLY
Qty: 45 TABLET | Refills: 0 | Status: SHIPPED | OUTPATIENT
Start: 2023-05-22 | End: 2023-06-21

## 2023-05-22 ASSESSMENT — PATIENT HEALTH QUESTIONNAIRE - PHQ9
3. TROUBLE FALLING OR STAYING ASLEEP: 1
1. LITTLE INTEREST OR PLEASURE IN DOING THINGS: 2
SUM OF ALL RESPONSES TO PHQ9 QUESTIONS 1 & 2: 4
SUM OF ALL RESPONSES TO PHQ QUESTIONS 1-9: 8
8. MOVING OR SPEAKING SO SLOWLY THAT OTHER PEOPLE COULD HAVE NOTICED. OR THE OPPOSITE, BEING SO FIGETY OR RESTLESS THAT YOU HAVE BEEN MOVING AROUND A LOT MORE THAN USUAL: 0
10. IF YOU CHECKED OFF ANY PROBLEMS, HOW DIFFICULT HAVE THESE PROBLEMS MADE IT FOR YOU TO DO YOUR WORK, TAKE CARE OF THINGS AT HOME, OR GET ALONG WITH OTHER PEOPLE: 0
9. THOUGHTS THAT YOU WOULD BE BETTER OFF DEAD, OR OF HURTING YOURSELF: 0
2. FEELING DOWN, DEPRESSED OR HOPELESS: 2
4. FEELING TIRED OR HAVING LITTLE ENERGY: 0
SUM OF ALL RESPONSES TO PHQ QUESTIONS 1-9: 8
7. TROUBLE CONCENTRATING ON THINGS, SUCH AS READING THE NEWSPAPER OR WATCHING TELEVISION: 0
SUM OF ALL RESPONSES TO PHQ QUESTIONS 1-9: 8
6. FEELING BAD ABOUT YOURSELF - OR THAT YOU ARE A FAILURE OR HAVE LET YOURSELF OR YOUR FAMILY DOWN: 0
5. POOR APPETITE OR OVEREATING: 3
SUM OF ALL RESPONSES TO PHQ QUESTIONS 1-9: 8

## 2023-05-22 NOTE — PROGRESS NOTES
[facility]  Medical Oncology at [place]  [phone]    Hematology / Oncology Consult    Reason for Visit:   Govind Mo is a 70 y.o. female who is seen in consultation at the request of Dr. Cindy Razo for NSCLC. History of Present Illness:   Dear colleagues,       It was my pleasure to see Govind Mo in follow-up on the medical oncology and hematology service on 22 May 2023. As you recall this delightful 67-year-old female carries a distant history of an apparent non-small cell lung cancer status post definitive resection via thoracotomy. She has been free of disease greater than 10 years. The patient previously underwent left upper procedure perhaps a wedge resection. More recently a new finding on CT imaging studies revealed the presence of a mass lesion pet imaging studies were conducted and were highly suggestive for malignancy. A CT-guided FNA and core biopsy were obtained under my care revealing the presence of a squamous cell cancer of the lung. Pet imaging studies revealed no evidence of distant or local regional disease. The patient's PD-L1 expression and NGS are currently pending. Today over the course of a 45-minute discussion with the patient I have outlined for her an appropriate direction of care. We spoke today regarding the natural history, biology and appropriate directions and care for non-small cell lung cancer. Our hopes that the patient is both resectable and operable. She will require cardiovascular and pulmonary clearance we will leave this within the purview of the thoracic surgical team.  The patient understands that she is likely to require at least pulmonary function studies and a noninvasive cardiac stress test.  Patient understands that while surgery would be optimal that potentially a radiosurgical option would be instituted if the patient was inoperable from a pulmonary perspective.   Should she prove high risk from a cardiovascular perspective

## 2023-05-22 NOTE — BH NOTE
[address]    Department of Behavioral Health    Outpatient Follow Up Medication Management      Collateral: None        Patient Identification: Bruce Fuentes is a 70year old  female. She lives alone in a rental home. She has two  sons, both of whom have two children. She sees her family about twice a month. Recently relocated from [place], [place], along with one of her sons/his family to be near the other son, who relocated here from [place]. Progress Note     Last Visit:   Clinical Formulation: Marcus Waldron has been diagnosed with MDD and does have symptoms presently but she doesn't want to try any antidepressants, as she never had positive outcomes. She does not have symptoms of rene or hypomania, so her treatment resistance doesn't seem to be due to BP. She has pressing health concerns; perhaps in the future, trials of untried antidepressants may improve her symptoms. Her anxiety and panic have responded well to Ativan in the past; she would benefit from starting this medication. Insomnia seems to be caused by her panic and anxiety, along with current health concerns; sleep improved with trazodone in the past and I agree with her that restarting may be beneficial. Due to history of ACES and trauma, and with current hypervigilance, she may have PTSD, which will be discussed again in a follow up visit. She is a positive, spirited person with good resilience, and with supportive family in the area, but is at risk of worsening depression and anxiety due to possible recurrence of lung cancer. Again, she may need further treatment for depression.    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

## 2023-05-22 NOTE — PROGRESS NOTES
Yohana Jimenez is a 70 y.o. female here for follow up for lung cancer . Patient has pain in her L flank at times. Patient saw her counselor today, she is feeling a little better. She is now taking ativan and taking trazadone at night. Biopsy results are in her chart.

## 2023-05-25 DIAGNOSIS — I25.10 ATHEROSCLEROTIC HEART DISEASE OF NATIVE CORONARY ARTERY WITHOUT ANGINA PECTORIS: ICD-10-CM

## 2023-05-25 DIAGNOSIS — I10 ESSENTIAL (PRIMARY) HYPERTENSION: ICD-10-CM

## 2023-05-25 RX ORDER — CARVEDILOL 25 MG/1
TABLET ORAL
Qty: 180 TABLET | Refills: 3 | Status: SHIPPED | OUTPATIENT
Start: 2023-05-25

## 2023-06-04 DIAGNOSIS — J44.9 CHRONIC OBSTRUCTIVE PULMONARY DISEASE, UNSPECIFIED (HCC): ICD-10-CM

## 2023-06-05 RX ORDER — FLUTICASONE PROPIONATE AND SALMETEROL XINAFOATE 115; 21 UG/1; UG/1
AEROSOL, METERED RESPIRATORY (INHALATION)
Qty: 12 EACH | Refills: 5 | Status: SHIPPED | OUTPATIENT
Start: 2023-06-05

## 2023-06-05 NOTE — TELEPHONE ENCOUNTER
Requested Prescriptions     Pending Prescriptions Disp Refills    ADVAIR [facility] 115-21 MCG/ACT inhaler [Pharmacy Med Name: ADVAIR -21 MCG INHALER] 12 each 5     Sig: INHALE 2 PUFFS BY MOUTH TWICE A DAY     Last Virtual Visit: 2/7/23, LOV 11/18/22  Last Labs 5/8/23 (Dr Shad Shah)  Last Refill 12/21/22

## 2023-06-19 ENCOUNTER — HOSPITAL ENCOUNTER (OUTPATIENT)
Facility: HOSPITAL | Age: 68
Discharge: HOME | End: 2023-06-22
Payer: MEDICAID

## 2023-06-19 DIAGNOSIS — F40.9 INSOMNIA DUE TO ANXIETY AND FEAR: Primary | ICD-10-CM

## 2023-06-19 DIAGNOSIS — F41.0 GENERALIZED ANXIETY DISORDER WITH PANIC ATTACKS: ICD-10-CM

## 2023-06-19 DIAGNOSIS — F51.05 INSOMNIA DUE TO ANXIETY AND FEAR: Primary | ICD-10-CM

## 2023-06-19 DIAGNOSIS — F41.1 GENERALIZED ANXIETY DISORDER WITH PANIC ATTACKS: ICD-10-CM

## 2023-06-19 DIAGNOSIS — F32.9 MAJOR DEPRESSIVE DISORDER WITHOUT PSYCHOTIC FEATURES: ICD-10-CM

## 2023-06-19 PROCEDURE — 99213 OFFICE O/P EST LOW 20 MIN: CPT | Performed by: MIDWIFE

## 2023-06-19 RX ORDER — CLONAZEPAM 1 MG/1
1 TABLET ORAL 2 TIMES DAILY PRN
Qty: 60 TABLET | Refills: 0 | Status: SHIPPED | OUTPATIENT
Start: 2023-06-19 | End: 2023-07-19

## 2023-06-19 RX ORDER — TRAZODONE HYDROCHLORIDE 100 MG/1
100 TABLET ORAL NIGHTLY
Qty: 90 TABLET | Refills: 1 | Status: SHIPPED | OUTPATIENT
Start: 2023-06-19

## 2023-06-19 NOTE — BH NOTE
Patient called and left a VM message stating that she has called and spoke to someone regarding some discomfort she is having with the vaginal cream. Patient stated that she was offered something she could  here in office. Patient is wondering if this is something she could  tomorrow. Please call and advise.  
Patient states that she tried Replens and her irritation is a little better, but she would like to try another product.  She will  some silicone lubricant to try prior to buying it. She said she has sensitivities so she may request a compounded estrogen cream if she does not have improvement.  
needed     Side effects from medications: none at present     Allergies: sulfa    Mental Status Examination     I. Reliability in Providing Information: Good      II. Personal Presentation: Looks stated age; dressed appropriately      III. Motor Activity: Normal      IV. Speech Pattern: Normal      V. Mood: Euthymic      Vl. Eye Contact: Appropriate       Vll. Affect: Appropriate      VllI. Thought Processes        Thought Process: goal-directed and logical          Thought Content:         Hallucinations: None        Delusions: None        Phobias, paranoia, ideas of reference: none        Suicidal Ideation/Attempts: No         Homicidal Ideation/Attempts: No         Self-harm: No        Obsessional and Compulsive Symptoms: Absent      IX. Cognitive Functions       Orientation Level: Oriented x4         Neurologic State: Alert         Attention/Concentration: Attentive       Abstract Thinking: Intact        Estimate of Intelligence: Average        Judgment/insight: Good         Memory/concentration: short/long-term intact                     X.Risks: None evident                 XI. Strengths and Assets Inventory:   Intelligence; Cooperative  Employment status: adequate   Living arrangements: stable  Interests/Hobbies     Assessment: Scott Storm is coping with depression and does not want to start a new medication for this but would like to change medication from Ativan to Klonopin for anxiety. She is benefiting from trazodone for sleep but may improve with an increase in the dose.      DSM 5 Diagnoses:  Major depressive disorder, recurrent, moderate, without psychotic features  Generalized anxiety disorder with panic attacks  Insomnia due to anxiety and fear     Plan:   Decrease Ativan to 1 mg per day for three days, then start Clonazepam 0.5 mg BID as needed, may increase to 1 mg BID  Increase trazodone to 100 mg HS  Call with concerns  Follow up in 4 weeks

## 2023-06-23 ENCOUNTER — TELEPHONE (OUTPATIENT)
Age: 68
End: 2023-06-23

## 2023-07-18 ENCOUNTER — HOSPITAL ENCOUNTER (OUTPATIENT)
Facility: HOSPITAL | Age: 68
Discharge: HOME | End: 2023-07-21
Payer: MEDICAID

## 2023-07-18 DIAGNOSIS — F51.05 INSOMNIA DUE TO ANXIETY AND FEAR: Primary | ICD-10-CM

## 2023-07-18 DIAGNOSIS — F41.0 GENERALIZED ANXIETY DISORDER WITH PANIC ATTACKS: ICD-10-CM

## 2023-07-18 DIAGNOSIS — F32.9 MAJOR DEPRESSIVE DISORDER WITHOUT PSYCHOTIC FEATURES: ICD-10-CM

## 2023-07-18 DIAGNOSIS — F40.9 INSOMNIA DUE TO ANXIETY AND FEAR: Primary | ICD-10-CM

## 2023-07-18 DIAGNOSIS — F41.1 GENERALIZED ANXIETY DISORDER WITH PANIC ATTACKS: ICD-10-CM

## 2023-07-18 PROCEDURE — 99213 OFFICE O/P EST LOW 20 MIN: CPT | Performed by: MIDWIFE

## 2023-07-18 RX ORDER — TRAZODONE HYDROCHLORIDE 50 MG/1
50 TABLET ORAL NIGHTLY
Qty: 30 TABLET | Refills: 2 | Status: SHIPPED | OUTPATIENT
Start: 2023-07-18 | End: 2023-10-16

## 2023-07-18 RX ORDER — LORAZEPAM 1 MG/1
1 TABLET ORAL 2 TIMES DAILY PRN
Qty: 60 TABLET | Refills: 0 | Status: SHIPPED | OUTPATIENT
Start: 2023-07-18 | End: 2023-08-17

## 2023-07-18 NOTE — BH NOTE
Angelic Bob, [facility] of [address] [place] [address]     Outpatient Follow Up Medication Management    Time in: 0930  Time out: 0950     Collateral: None     Patient Identification: Amanda Gonzalez is a 70year old  female. She lives alone in a rental home. She has two  sons, both of whom have two children. She sees her family about twice a month. Recently relocated from [facility], along with one of her sons/his family to be near the other son, who relocated here from [place]. Last Visit:   Clinical Formulation: Ramon Boothe had informed her sons about her current evaluation of lung cancer recurrence. She was waiting for pathology results. Per her ONC notes, the cancer has not metastasized and stereotatic radiology treatment has been recommended, as she is not a candidate for surgery. Sleep had only partially improved with trazodone and the dose was increased to 100 mg. Ativan wasn't effective and she was switched to Klonopin, which was much more helpful in the past.     Today:     CC: Ramon Boothe presents for ongoing medication management of depression, anxiety, and insomnia     HPI: Ramon Boothe is scheduled to be in [facility] for radiation mold fitting tomorrow. Tried Klonopin but it didn't relax her, rather it made her \"flap at the gums and talk too much. \" Restarted Ativan and needs a refill, 1 mg BID. Mood: good days and bad days, mainly good. Relieved by the news that her lung cancer didn't metastasize. Son and his wife are being supportive; he drives her to her medical appointments and she has been staying at his house prior to appointments. She is not 100% comfortable with this as she prefers to sleep and shower at her house but it is easier for her son overall. Her other son is being present as well, and visited her yesterday. Her  dropped by yesterday with another passenger, who lives up the road, to meet Ramon Boothe.  They exchanged phone numbers and Ramon Boothe feels she has met

## 2023-07-19 DIAGNOSIS — I25.10 ATHEROSCLEROTIC HEART DISEASE OF NATIVE CORONARY ARTERY WITHOUT ANGINA PECTORIS: ICD-10-CM

## 2023-07-19 RX ORDER — CLOPIDOGREL BISULFATE 75 MG/1
TABLET ORAL
Qty: 90 TABLET | Refills: 1 | Status: SHIPPED | OUTPATIENT
Start: 2023-07-19

## 2023-07-19 NOTE — TELEPHONE ENCOUNTER
Requested Prescriptions     Pending Prescriptions Disp Refills    clopidogrel (PLAVIX) 75 MG tablet [Pharmacy Med Name: CLOPIDOGREL 75 MG TABLET] 90 tablet 1     Sig: TAKE 1 TABLET BY MOUTH EVERY DAY     Last seen:  2/7/23 virtual    Last filled:    clopidogreL (PLAVIX) 75 mg tab 90 Tablet 1 11/18/2022

## 2023-08-14 ENCOUNTER — HOSPITAL ENCOUNTER (OUTPATIENT)
Facility: HOSPITAL | Age: 68
Discharge: HOME | End: 2023-08-17
Payer: MEDICAID

## 2023-08-14 DIAGNOSIS — F32.9 MAJOR DEPRESSIVE DISORDER WITHOUT PSYCHOTIC FEATURES: ICD-10-CM

## 2023-08-14 DIAGNOSIS — F51.05 INSOMNIA DUE TO ANXIETY AND FEAR: Primary | ICD-10-CM

## 2023-08-14 DIAGNOSIS — F41.1 GENERALIZED ANXIETY DISORDER WITH PANIC ATTACKS: ICD-10-CM

## 2023-08-14 DIAGNOSIS — F40.9 INSOMNIA DUE TO ANXIETY AND FEAR: Primary | ICD-10-CM

## 2023-08-14 DIAGNOSIS — F41.0 GENERALIZED ANXIETY DISORDER WITH PANIC ATTACKS: ICD-10-CM

## 2023-08-14 PROCEDURE — 99213 OFFICE O/P EST LOW 20 MIN: CPT | Performed by: MIDWIFE

## 2023-08-14 RX ORDER — LORAZEPAM 1 MG/1
1 TABLET ORAL 2 TIMES DAILY PRN
Qty: 60 TABLET | Refills: 0 | Status: SHIPPED | OUTPATIENT
Start: 2023-08-14 | End: 2023-09-13

## 2023-08-14 NOTE — BH NOTE
Dosage:  Trazodone 50 mg HS   Clonazepam 1 mg BID as needed     Side effects from medications:   Clonazepam caused overstimulation     Allergies: sulfa     Mental Status Examination     I. Reliability in Providing Information: Good      II. Personal Presentation: Looks stated age; dressed appropriately      III. Motor Activity: Normal      IV. Speech Pattern: Normal      V. Mood: Appropriate for context     Vl. Eye Contact: Appropriate       Vll. Affect: Appropriate for context/sad     VllI. Thought Processes        Thought Process: goal-directed and logical          Thought Content:         Hallucinations: None        Delusions: None        Phobias, paranoia, ideas of reference: none        Suicidal Ideation/Attempts: No         Homicidal Ideation/Attempts: No         Self-harm: No        Obsessional and Compulsive Symptoms: Absent      IX. Cognitive Functions       Orientation Level: Oriented x4         Neurologic State: Alert         Attention/Concentration: Attentive       Abstract Thinking: Intact        Estimate of Intelligence: Average        Judgment/insight: Good         Memory/concentration: short/long-term intact                  X.Risks: None evident                 XI. Strengths and Assets Inventory:   Intelligence; Cooperative  Employment status: adequate   Living arrangements: stable  Interests/Hobbies     Assessment: Sarah Hearn is doing as well as can be expected, given Stage I Lung Cancer and current radiation treatment. She is responding well with trazodone for sleep and Ativan for anxiety.       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

## 2023-09-13 DIAGNOSIS — F51.05 INSOMNIA DUE TO ANXIETY AND FEAR: ICD-10-CM

## 2023-09-13 DIAGNOSIS — M54.40 LUMBAGO WITH SCIATICA, UNSPECIFIED SIDE: ICD-10-CM

## 2023-09-13 DIAGNOSIS — F40.9 INSOMNIA DUE TO ANXIETY AND FEAR: ICD-10-CM

## 2023-09-13 DIAGNOSIS — F32.9 MAJOR DEPRESSIVE DISORDER WITHOUT PSYCHOTIC FEATURES: ICD-10-CM

## 2023-09-13 DIAGNOSIS — F41.1 GENERALIZED ANXIETY DISORDER WITH PANIC ATTACKS: ICD-10-CM

## 2023-09-13 DIAGNOSIS — F41.0 GENERALIZED ANXIETY DISORDER WITH PANIC ATTACKS: ICD-10-CM

## 2023-09-13 DIAGNOSIS — J44.9 CHRONIC OBSTRUCTIVE PULMONARY DISEASE, UNSPECIFIED (HCC): ICD-10-CM

## 2023-09-13 RX ORDER — GABAPENTIN 300 MG/1
CAPSULE ORAL
Qty: 270 CAPSULE | Refills: 5 | Status: SHIPPED | OUTPATIENT
Start: 2023-09-13 | End: 2023-10-13

## 2023-09-13 RX ORDER — LORAZEPAM 1 MG/1
1 TABLET ORAL 2 TIMES DAILY PRN
Qty: 60 TABLET | Refills: 0 | Status: SHIPPED | OUTPATIENT
Start: 2023-09-13 | End: 2023-10-13

## 2023-09-13 RX ORDER — IPRATROPIUM BROMIDE 17 UG/1
AEROSOL, METERED RESPIRATORY (INHALATION)
Qty: 12.9 EACH | Refills: 5 | Status: SHIPPED | OUTPATIENT
Start: 2023-09-13

## 2023-09-13 NOTE — TELEPHONE ENCOUNTER
Requested Prescriptions     Pending Prescriptions Disp Refills    gabapentin (NEURONTIN) 300 MG capsule [Pharmacy Med Name: GABAPENTIN 300 MG CAPSULE] 270 capsule 0     Sig: TAKE 1 CAPSULE BY MOUTH THREE (3) TIMES DAILY.  MAX DAILY AMOUNT: 900 MG.    ATROVENT HFA 17 MCG/ACT inhaler [Pharmacy Med Name: ATROVENT 17 MCG HFA INHALER] 12.9 each 5     Sig: INHALE 2 PUFFS BY MOUTH EVERY 4 HOURS AS NEEDED FOR WHEEZE     Last seen:  2/7/23 Virtual    Last filled:      gabapentin (NEURONTIN) 300 mg capsule 270 Capsule 1 11/18/2022     ipratropium (Atrovent HFA) 17 mcg/actuation inhaler 12.9 g 5 11/18/2022

## 2023-10-10 ENCOUNTER — HOSPITAL ENCOUNTER (OUTPATIENT)
Facility: HOSPITAL | Age: 68
Discharge: HOME | End: 2023-10-13
Payer: MEDICAID

## 2023-10-10 DIAGNOSIS — F41.1 GENERALIZED ANXIETY DISORDER WITH PANIC ATTACKS: ICD-10-CM

## 2023-10-10 DIAGNOSIS — F40.9 INSOMNIA DUE TO ANXIETY AND FEAR: Primary | ICD-10-CM

## 2023-10-10 DIAGNOSIS — F41.0 GENERALIZED ANXIETY DISORDER WITH PANIC ATTACKS: ICD-10-CM

## 2023-10-10 DIAGNOSIS — F32.9 MAJOR DEPRESSIVE DISORDER WITHOUT PSYCHOTIC FEATURES: ICD-10-CM

## 2023-10-10 DIAGNOSIS — F51.05 INSOMNIA DUE TO ANXIETY AND FEAR: Primary | ICD-10-CM

## 2023-10-10 PROCEDURE — 99213 OFFICE O/P EST LOW 20 MIN: CPT | Performed by: MIDWIFE

## 2023-10-10 RX ORDER — LORAZEPAM 1 MG/1
1 TABLET ORAL 2 TIMES DAILY PRN
Qty: 60 TABLET | Refills: 0 | Status: SHIPPED | OUTPATIENT
Start: 2023-10-10 | End: 2023-11-09

## 2023-10-10 NOTE — BH NOTE
Angelic Bansal, [address]     10/10/2023     Outpatient Follow Up Medication Management     Time in: 0930  Time out: 0950     Collateral: None     Patient Identification: Bryant Mejia is a 70year old  female. She lives alone in a rental home. She has two  sons, both of whom have two children. She sees her family about twice a month. Recently relocated from [facility], along with one of her sons/his family to be near the other son, who relocated here from [place]. She is currently in treatment for recurrence of lung cancer. Last Visit: Radha De León was in the middle of radiation therapy. No med changes were made. Today:     CC: Radha De León presents for ongoing medication management of depression, anxiety, and insomnia. HPI:     Depression and anxiety are stable. Finished with radiation; has residual bone pain, which her providers stare is not related to her treatment,  and skin is dry. Following ONC with PET scan next month in [place]. Has gained weight; was 93 lb and is now 109 lb. Has been eating well, grazing all day. Sleep: Goes to bed in her recliner at [id number], wakes between 4636-2930, can go back to sleep if she chooses to. Trazodone is effective for initiation of sleep. Still doesn't have a car, kids not following through on offer to purchase one. Bus is unreliable. Taxi is not affordable. Radha De León has no new concerns today. REVIEW OF SYSTEMS: Pertinent items are noted in the History of Present Illness. All other Systems reviewed and are considered negative. Current Psychiatric Medications and Dosage:  Trazodone 50 mg HS   Ativan 1 mg BID as needed     Side effects from medications:   Clonazepam caused overstimulation     Allergies: sulfa     Mental Status Examination     I. Reliability in Providing Information: Good      II. Personal Presentation: Looks stated age; dressed appropriately      III.  Motor Activity: Normal

## 2023-10-23 DIAGNOSIS — I25.10 ATHEROSCLEROTIC HEART DISEASE OF NATIVE CORONARY ARTERY WITHOUT ANGINA PECTORIS: ICD-10-CM

## 2023-10-23 DIAGNOSIS — I10 ESSENTIAL (PRIMARY) HYPERTENSION: ICD-10-CM

## 2023-10-24 RX ORDER — LISINOPRIL 5 MG/1
TABLET ORAL
Qty: 90 TABLET | Refills: 1 | Status: SHIPPED | OUTPATIENT
Start: 2023-10-24

## 2023-10-24 RX ORDER — ATORVASTATIN CALCIUM 40 MG/1
TABLET, FILM COATED ORAL
Qty: 90 TABLET | Refills: 1 | Status: SHIPPED | OUTPATIENT
Start: 2023-10-24

## 2023-11-14 DIAGNOSIS — F41.0 GENERALIZED ANXIETY DISORDER WITH PANIC ATTACKS: Primary | ICD-10-CM

## 2023-11-14 DIAGNOSIS — F41.1 GENERALIZED ANXIETY DISORDER WITH PANIC ATTACKS: Primary | ICD-10-CM

## 2023-11-14 RX ORDER — LORAZEPAM 1 MG/1
1 TABLET ORAL 2 TIMES DAILY PRN
Qty: 60 TABLET | Refills: 0 | Status: SHIPPED | OUTPATIENT
Start: 2023-11-14 | End: 2023-12-14

## 2023-12-05 DIAGNOSIS — I25.10 ATHEROSCLEROTIC HEART DISEASE OF NATIVE CORONARY ARTERY WITHOUT ANGINA PECTORIS: ICD-10-CM

## 2023-12-05 RX ORDER — CLOPIDOGREL BISULFATE 75 MG/1
TABLET ORAL
Qty: 90 TABLET | Refills: 1 | Status: SHIPPED | OUTPATIENT
Start: 2023-12-05

## 2023-12-07 ENCOUNTER — APPOINTMENT (OUTPATIENT)
Facility: HOSPITAL | Age: 68
End: 2023-12-07
Payer: MEDICAID

## 2023-12-07 ENCOUNTER — HOSPITAL ENCOUNTER (EMERGENCY)
Facility: HOSPITAL | Age: 68
Discharge: HOME | End: 2023-12-07
Attending: FAMILY MEDICINE | Admitting: FAMILY MEDICINE
Payer: MEDICAID

## 2023-12-07 VITALS
BODY MASS INDEX: 19.46 KG/M2 | HEIGHT: 64 IN | WEIGHT: 114 LBS | SYSTOLIC BLOOD PRESSURE: 111 MMHG | OXYGEN SATURATION: 97 % | HEART RATE: 64 BPM | TEMPERATURE: 97.7 F | RESPIRATION RATE: 18 BRPM | DIASTOLIC BLOOD PRESSURE: 60 MMHG

## 2023-12-07 DIAGNOSIS — R07.9 CHEST PAIN, UNSPECIFIED TYPE: Primary | ICD-10-CM

## 2023-12-07 DIAGNOSIS — J44.1 COPD EXACERBATION (HCC): ICD-10-CM

## 2023-12-07 LAB
ALBUMIN SERPL-MCNC: 3.6 G/DL (ref 3.5–5)
ALBUMIN/GLOB SERPL: 1 (ref 1.1–2.2)
ALP SERPL-CCNC: 101 U/L (ref 45–117)
ALT SERPL-CCNC: 26 U/L (ref 12–78)
ANION GAP SERPL CALC-SCNC: 9 MMOL/L (ref 5–15)
AST SERPL-CCNC: 26 U/L (ref 15–37)
BASOPHILS # BLD: 0 K/UL (ref 0–0.1)
BASOPHILS NFR BLD: 0 % (ref 0–1)
BILIRUB SERPL-MCNC: 0.5 MG/DL (ref 0.2–1)
BUN SERPL-MCNC: 15 MG/DL (ref 6–20)
BUN/CREAT SERPL: 19 (ref 12–20)
CALCIUM SERPL-MCNC: 9.2 MG/DL (ref 8.5–10.1)
CHLORIDE SERPL-SCNC: 104 MMOL/L (ref 97–108)
CO2 SERPL-SCNC: 28 MMOL/L (ref 21–32)
CREAT SERPL-MCNC: 0.77 MG/DL (ref 0.55–1.02)
DIFFERENTIAL METHOD BLD: NORMAL
EKG ATRIAL RATE: 67 BPM
EKG DIAGNOSIS: NORMAL
EKG P AXIS: 75 DEGREES
EKG P-R INTERVAL: 186 MS
EKG Q-T INTERVAL: 388 MS
EKG QRS DURATION: 88 MS
EKG QTC CALCULATION (BAZETT): 409 MS
EKG R AXIS: 73 DEGREES
EKG T AXIS: 74 DEGREES
EKG VENTRICULAR RATE: 67 BPM
EOSINOPHIL # BLD: 0.1 K/UL (ref 0–0.4)
EOSINOPHIL NFR BLD: 2 % (ref 0–7)
ERYTHROCYTE [DISTWIDTH] IN BLOOD BY AUTOMATED COUNT: 13.4 % (ref 11.5–14.5)
ETHANOL SERPL-MCNC: <10 MG/DL (ref 0–0.08)
GLOBULIN SER CALC-MCNC: 3.5 G/DL (ref 2–4)
GLUCOSE SERPL-MCNC: 138 MG/DL (ref 65–100)
HCT VFR BLD AUTO: 35.5 % (ref 35–47)
HGB BLD-MCNC: 12 G/DL (ref 11.5–16)
IMM GRANULOCYTES # BLD AUTO: 0 K/UL (ref 0–0.04)
IMM GRANULOCYTES NFR BLD AUTO: 0 % (ref 0–0.5)
LYMPHOCYTES # BLD: 2.7 K/UL (ref 0.8–3.5)
LYMPHOCYTES NFR BLD: 40 % (ref 12–49)
MCH RBC QN AUTO: 30.4 PG (ref 26–34)
MCHC RBC AUTO-ENTMCNC: 33.8 G/DL (ref 30–36.5)
MCV RBC AUTO: 89.9 FL (ref 80–99)
MONOCYTES # BLD: 0.5 K/UL (ref 0–1)
MONOCYTES NFR BLD: 8 % (ref 5–13)
NEUTS SEG # BLD: 3.4 K/UL (ref 1.8–8)
NEUTS SEG NFR BLD: 50 % (ref 32–75)
NRBC # BLD: 0 K/UL (ref 0–0.01)
NRBC BLD-RTO: 0 PER 100 WBC
PLATELET # BLD AUTO: 193 K/UL (ref 150–400)
PMV BLD AUTO: 9.3 FL (ref 8.9–12.9)
POTASSIUM SERPL-SCNC: 4.5 MMOL/L (ref 3.5–5.1)
PROT SERPL-MCNC: 7.1 G/DL (ref 6.4–8.2)
RBC # BLD AUTO: 3.95 M/UL (ref 3.8–5.2)
SODIUM SERPL-SCNC: 141 MMOL/L (ref 136–145)
TROPONIN I SERPL HS-MCNC: 4 NG/L (ref 0–51)
TROPONIN I SERPL HS-MCNC: 5 NG/L (ref 0–51)
WBC # BLD AUTO: 6.8 K/UL (ref 3.6–11)

## 2023-12-07 PROCEDURE — 71045 X-RAY EXAM CHEST 1 VIEW: CPT

## 2023-12-07 PROCEDURE — 6370000000 HC RX 637 (ALT 250 FOR IP): Performed by: FAMILY MEDICINE

## 2023-12-07 PROCEDURE — 94640 AIRWAY INHALATION TREATMENT: CPT

## 2023-12-07 PROCEDURE — 93005 ELECTROCARDIOGRAM TRACING: CPT | Performed by: FAMILY MEDICINE

## 2023-12-07 PROCEDURE — 99285 EMERGENCY DEPT VISIT HI MDM: CPT

## 2023-12-07 PROCEDURE — 72125 CT NECK SPINE W/O DYE: CPT

## 2023-12-07 PROCEDURE — 85025 COMPLETE CBC W/AUTO DIFF WBC: CPT

## 2023-12-07 PROCEDURE — 84484 ASSAY OF TROPONIN QUANT: CPT

## 2023-12-07 PROCEDURE — 70450 CT HEAD/BRAIN W/O DYE: CPT

## 2023-12-07 PROCEDURE — 36415 COLL VENOUS BLD VENIPUNCTURE: CPT

## 2023-12-07 PROCEDURE — 80053 COMPREHEN METABOLIC PANEL: CPT

## 2023-12-07 PROCEDURE — 82077 ASSAY SPEC XCP UR&BREATH IA: CPT

## 2023-12-07 RX ORDER — PREDNISONE 20 MG/1
40 TABLET ORAL DAILY
Qty: 8 TABLET | Refills: 0 | Status: SHIPPED | OUTPATIENT
Start: 2023-12-07 | End: 2023-12-11

## 2023-12-07 RX ORDER — ALBUTEROL SULFATE 90 UG/1
2 AEROSOL, METERED RESPIRATORY (INHALATION) 4 TIMES DAILY PRN
Qty: 18 G | Refills: 0 | Status: SHIPPED | OUTPATIENT
Start: 2023-12-07

## 2023-12-07 RX ORDER — IPRATROPIUM BROMIDE AND ALBUTEROL SULFATE 2.5; .5 MG/3ML; MG/3ML
1 SOLUTION RESPIRATORY (INHALATION)
Status: COMPLETED | OUTPATIENT
Start: 2023-12-07 | End: 2023-12-07

## 2023-12-07 RX ORDER — ASPIRIN 81 MG/1
81 TABLET, CHEWABLE ORAL ONCE
Status: COMPLETED | OUTPATIENT
Start: 2023-12-07 | End: 2023-12-07

## 2023-12-07 RX ORDER — PREDNISONE 20 MG/1
40 TABLET ORAL
Status: COMPLETED | OUTPATIENT
Start: 2023-12-07 | End: 2023-12-07

## 2023-12-07 RX ADMIN — ASPIRIN 81 MG: 81 TABLET, CHEWABLE ORAL at 05:05

## 2023-12-07 RX ADMIN — PREDNISONE 40 MG: 20 TABLET ORAL at 05:43

## 2023-12-07 RX ADMIN — IPRATROPIUM BROMIDE AND ALBUTEROL SULFATE 1 DOSE: .5; 2.5 SOLUTION RESPIRATORY (INHALATION) at 05:44

## 2023-12-07 ASSESSMENT — PAIN - FUNCTIONAL ASSESSMENT: PAIN_FUNCTIONAL_ASSESSMENT: NONE - DENIES PAIN

## 2023-12-07 ASSESSMENT — LIFESTYLE VARIABLES
HOW MANY STANDARD DRINKS CONTAINING ALCOHOL DO YOU HAVE ON A TYPICAL DAY: 1 OR 2
HOW OFTEN DO YOU HAVE A DRINK CONTAINING ALCOHOL: MONTHLY OR LESS

## 2023-12-07 NOTE — DISCHARGE INSTRUCTIONS
--Prednisone 40 mg daily for the next 5 days. You have the first dose this morning. --Albuterol inhaler 3 times daily or every 4 hours as needed for wheezing. --Follow up with your doctor early next week.

## 2023-12-07 NOTE — ED PROVIDER NOTES
(3) TIMES DAILY. MAX DAILY AMOUNT: 900 MG. LISINOPRIL (PRINIVIL;ZESTRIL) 5 MG TABLET    TAKE 1 TABLET BY MOUTH EVERY DAY    LORAZEPAM (ATIVAN) 1 MG TABLET    Take 1 tablet by mouth 2 times daily as needed for Anxiety for up to 30 days. Max Daily Amount: 2 mg    NITROGLYCERIN (NITROSTAT) 0.4 MG SL TABLET    Place 1 tablet under the tongue    TRAZODONE (DESYREL) 50 MG TABLET    Take 1 tablet by mouth nightly       SCREENINGS               No data recorded        PHYSICAL EXAM      ED Triage Vitals   Enc Vitals Group      BP       Pulse       Resp       Temp       Temp src       SpO2       Weight       Height       Head Circumference       Peak Flow       Pain Score       Pain Loc       Pain Edu? Excl. in [address]? Physical Exam  Vitals reviewed. Constitutional:       Appearance: Normal appearance. Comments: Very thin. HENT:      Head: Normocephalic and atraumatic. Right Ear: External ear normal.      Left Ear: External ear normal.      Nose: Nose normal.      Mouth/Throat:      Mouth: Mucous membranes are moist.   Eyes:      Extraocular Movements: Extraocular movements intact. Pupils: Pupils are equal, round, and reactive to light. Cardiovascular:      Rate and Rhythm: Normal rate and regular rhythm. Pulmonary:      Effort: Pulmonary effort is normal.      Breath sounds: No wheezing or rales. Comments: Breath sounds diminished throughout. Scattered wheezes. Chest:      Chest wall: No tenderness. Abdominal:      General: Abdomen is flat. Palpations: Abdomen is soft. Tenderness: There is no abdominal tenderness. There is no guarding. Musculoskeletal:         General: No swelling, deformity or signs of injury. Normal range of motion. Cervical back: Normal range of motion and neck supple. No tenderness. Right lower leg: No edema. Left lower leg: No edema. Skin:     General: Skin is warm and dry.    Neurological:      Mental Status: She is alert and

## 2023-12-07 NOTE — ED TRIAGE NOTES
Pt arrived ambulatory accompanied by her son with report of chest pain and nausea that started 30 minutes prior to arrival. Pt states that she was awakened with the pain. She got up OOB and was walking in the mancia and fell. Pt states that her son heard her fall and came to help her. Pt took Nitro SL x 3 tabs over 15 minutes which took her pain from a 10 to a 2 prior to arrival. Pt denies pain at this time. Pt with a hematoma to left forehead.

## 2023-12-12 ENCOUNTER — HOSPITAL ENCOUNTER (OUTPATIENT)
Facility: HOSPITAL | Age: 68
Discharge: HOME | End: 2023-12-15

## 2023-12-13 NOTE — BH NOTE
Angelic Bowser, [facility]     Department of [address] [place] [address]     Outpatient Follow Up Medication Management     Time in: 0900     Time out: 0930     Collateral: None     Patient Identification: Baldomero Mendez is a 70year old  female. She lives alone in a rental home. She has two  sons, both of whom have two children. She sees her family about twice a month. Recently relocated from [facility], along with one of her sons/his family to be near the other son, who relocated here from [place]. She is currently in treatment for recurrence of lung cancer. Last Visit/interim information: Chirag Acharya had finished radiation. She was in the [facility] ED after a fall at home on December 7th; possibly associated with standing quickly after nitroglycerin use. MI ruled out at that time. Today:     CC: Chirag Acharya presents for ongoing medication management of depression, anxiety, and insomnia. HPI:      Chirag Acharya denies symptoms of anxiety or depression. While staying with one of her sons last week, she had chest pain, took nitroglycerin, stood quickly, falling in the process. Was seen in [facility] ED where MI was ruled out and imagining did not find internal bleeding. Has been on steroids for COPD since in the ER, today is the first day off. May restart trazodone as desired. Plans to sleep in her bed tonight rather than in her recliner. Happily, she has gained weight; was 93 lb, then 109 lb, then up to 114 when in the ED last week. She has no new concerns. She continues to be followed by oncology for lung nodule, is s/p radiation therapy, which did not entirely eradicate the lesion. REVIEW OF SYSTEMS: Pertinent items are noted in the History of Present Illness. All other Systems reviewed and are considered negative.      Current Psychiatric Medications and Dosage:  Trazodone 50 mg HS   Ativan 1 mg BID as needed     Side effects from medications:   Clonazepam caused overstimulation     Allergies: sulfa     Mental

## 2023-12-14 ENCOUNTER — TRANSCRIBE ORDERS (OUTPATIENT)
Facility: HOSPITAL | Age: 68
End: 2023-12-14

## 2023-12-14 DIAGNOSIS — C34.12 MALIGNANT NEOPLASM OF UPPER LOBE OF LEFT LUNG (HCC): Primary | ICD-10-CM

## 2023-12-15 RX ORDER — ALBUTEROL SULFATE 90 UG/1
2 AEROSOL, METERED RESPIRATORY (INHALATION) 4 TIMES DAILY PRN
Qty: 8.5 EACH | Refills: 5 | Status: SHIPPED | OUTPATIENT
Start: 2023-12-15

## 2024-01-03 ENCOUNTER — TELEPHONE (OUTPATIENT)
Age: 69
End: 2024-01-03

## 2024-01-03 NOTE — TELEPHONE ENCOUNTER
Patient is unsure of what to do. She is wondering about immunotherapy. Please call to update and advise.

## 2024-01-11 ENCOUNTER — OFFICE VISIT (OUTPATIENT)
Age: 69
End: 2024-01-11
Payer: MEDICARE

## 2024-01-11 VITALS
TEMPERATURE: 97.7 F | SYSTOLIC BLOOD PRESSURE: 105 MMHG | HEART RATE: 66 BPM | BODY MASS INDEX: 19.81 KG/M2 | HEIGHT: 64 IN | DIASTOLIC BLOOD PRESSURE: 62 MMHG | RESPIRATION RATE: 18 BRPM | OXYGEN SATURATION: 94 % | WEIGHT: 116 LBS

## 2024-01-11 DIAGNOSIS — C34.92 NON-SMALL CELL CANCER OF LEFT LUNG (HCC): ICD-10-CM

## 2024-01-11 DIAGNOSIS — Z85.118 HX OF CANCER OF LUNG: Primary | ICD-10-CM

## 2024-01-11 PROCEDURE — 3078F DIAST BP <80 MM HG: CPT | Performed by: INTERNAL MEDICINE

## 2024-01-11 PROCEDURE — G8399 PT W/DXA RESULTS DOCUMENT: HCPCS | Performed by: INTERNAL MEDICINE

## 2024-01-11 PROCEDURE — G8427 DOCREV CUR MEDS BY ELIG CLIN: HCPCS | Performed by: INTERNAL MEDICINE

## 2024-01-11 PROCEDURE — 99214 OFFICE O/P EST MOD 30 MIN: CPT | Performed by: INTERNAL MEDICINE

## 2024-01-11 PROCEDURE — G8484 FLU IMMUNIZE NO ADMIN: HCPCS | Performed by: INTERNAL MEDICINE

## 2024-01-11 PROCEDURE — 1123F ACP DISCUSS/DSCN MKR DOCD: CPT | Performed by: INTERNAL MEDICINE

## 2024-01-11 PROCEDURE — 4004F PT TOBACCO SCREEN RCVD TLK: CPT | Performed by: INTERNAL MEDICINE

## 2024-01-11 PROCEDURE — G8420 CALC BMI NORM PARAMETERS: HCPCS | Performed by: INTERNAL MEDICINE

## 2024-01-11 PROCEDURE — 1090F PRES/ABSN URINE INCON ASSESS: CPT | Performed by: INTERNAL MEDICINE

## 2024-01-11 PROCEDURE — 3017F COLORECTAL CA SCREEN DOC REV: CPT | Performed by: INTERNAL MEDICINE

## 2024-01-11 PROCEDURE — 3074F SYST BP LT 130 MM HG: CPT | Performed by: INTERNAL MEDICINE

## 2024-01-11 NOTE — PROGRESS NOTES
Thalia Arroyo is a 68 y.o. female here for follow up for lung mass.  Patient has completed SRBT at [facility].   Patient states last one was around September 7, 2023.  Patient states she has been feeling pretty \"weird\"  her muscles and bones aches.   Patient is having problems with diarrhea.  Patient is having constipatin, which can last 1-2 weeks.  She currently has diarrhea.     Patient had chest pain earlier this week, took Nitro x 2, stood up then fell and hit her head.    
limits.  Impression: No acute fracture or subluxation. Multilevel degenerative changes.  CT HEAD WO CONTRAST  Narrative: EXAM: CT HEAD WO CONTRAST    INDICATION: fall, on DOAC    COMPARISON: None.    CONTRAST: None.    TECHNIQUE: Unenhanced CT of the head was performed using 5 mm images. Brain and  bone windows were generated. Coronal and sagittal reformats. CT dose reduction  was achieved through use of a standardized protocol tailored for this  examination and automatic exposure control for dose modulation.      FINDINGS:  The ventricles and sulci are normal in size, shape and configuration. There is  no significant white matter disease. There is no intracranial hemorrhage,  extra-axial collection, or mass effect. The basilar cisterns are open. No CT  evidence of acute infarct.    The bone windows demonstrate no abnormalities. The visualized portions of the  paranasal sinuses and mastoid air cells are clear.  Impression: No acute intracranial process.  XR CHEST PORTABLE  Narrative: EXAM:  XR CHEST PORTABLE    INDICATION: Chest pain    COMPARISON: 5/16/2023    TECHNIQUE: Portable chest AP view    FINDINGS: The cardiac silhouette is within normal limits. Previously seen left  upper lobe pulmonary nodule appears decreased in size. There is no new airspace  disease. Degenerative/posttraumatic changes are again seen in the left shoulder.  Impression: No evidence of acute cardiac pulmonary process. Apparent interval decrease in  size of left upper lobe pulmonary nodule.        Assessment and Recommendations:   My impression is that of a kind 68-year-old female with a remote history of a non-small cell lung cancer status post definitive surgical extirpation dating back to 2009.  The patient now has a recent development of a left upper lobe mass lesion consistent with a non-small cell lung cancer.  The patient's histology was squamous cell.  The patient was PD-L1 positive with a TPS of 25%.  At this juncture she has

## 2024-01-15 DIAGNOSIS — F41.1 GENERALIZED ANXIETY DISORDER WITH PANIC ATTACKS: Primary | ICD-10-CM

## 2024-01-15 DIAGNOSIS — F41.0 GENERALIZED ANXIETY DISORDER WITH PANIC ATTACKS: Primary | ICD-10-CM

## 2024-01-15 RX ORDER — TRAZODONE HYDROCHLORIDE 50 MG/1
50 TABLET ORAL NIGHTLY
Qty: 30 TABLET | Refills: 2 | Status: SHIPPED | OUTPATIENT
Start: 2024-01-15 | End: 2024-04-14

## 2024-01-15 RX ORDER — LORAZEPAM 1 MG/1
1 TABLET ORAL 2 TIMES DAILY PRN
Qty: 60 TABLET | Refills: 0 | Status: SHIPPED | OUTPATIENT
Start: 2024-01-15 | End: 2024-02-14

## 2024-02-11 NOTE — BH NOTE
needed     Side effects from medications:   Clonazepam caused overstimulation     Allergies: sulfa     Mental Status Examination     I. Reliability in Providing Information: Good      II. Personal Presentation: Looks stated age; dressed appropriately      III. Motor Activity: Normal      IV. Speech Pattern: Normal      V. Mood: Euthymic     Vl. Eye Contact: Appropriate       Vll. Affect: Happy     VllI. Thought Processes        Thought Process: goal-directed and logical          Thought Content:         Hallucinations: None        Delusions: None        Phobias, paranoia, ideas of reference: none        Suicidal Ideation/Attempts: No         Homicidal Ideation/Attempts: No         Self-harm: No        Obsessional and Compulsive Symptoms: Absent      IX. Cognitive Functions       Orientation Level: Oriented x4         Neurologic State: Alert         Attention/Concentration: Attentive       Abstract Thinking: Intact        Estimate of Intelligence: Average        Judgment/insight: Good         Memory/concentration: short/long-term intact                  X.Risks: None evident                 XI. Strengths and Assets Inventory:   Intelligence;Cooperative  Employment status: adequate   Living arrangements: stable  Interests/Hobbies     Assessment: Thalia is doing well; no medication changes are recommended at this time.     DSM 5 Diagnoses:  Major depressive disorder, recurrent, moderate, without psychotic features, improved F32.9  Generalized anxiety disorder with panic attacks, improved F41.1  Insomnia due to anxiety and fear, improved F51.05     Plan:   Continue Ativan 1 mg BID as needed; end date 3/13  Continue trazodone 50 mg HS/PRN; end date 4/14  Call with concerns  Follow up in 2 months or sooner as needed

## 2024-02-12 ENCOUNTER — HOSPITAL ENCOUNTER (OUTPATIENT)
Facility: HOSPITAL | Age: 69
Discharge: HOME | End: 2024-02-15
Payer: MEDICARE

## 2024-02-12 DIAGNOSIS — F40.9 INSOMNIA DUE TO ANXIETY AND FEAR: ICD-10-CM

## 2024-02-12 DIAGNOSIS — F32.9 MAJOR DEPRESSIVE DISORDER WITHOUT PSYCHOTIC FEATURES: Primary | ICD-10-CM

## 2024-02-12 DIAGNOSIS — F41.1 GENERALIZED ANXIETY DISORDER WITH PANIC ATTACKS: ICD-10-CM

## 2024-02-12 DIAGNOSIS — F51.05 INSOMNIA DUE TO ANXIETY AND FEAR: ICD-10-CM

## 2024-02-12 DIAGNOSIS — F41.0 GENERALIZED ANXIETY DISORDER WITH PANIC ATTACKS: ICD-10-CM

## 2024-02-12 PROCEDURE — 99213 OFFICE O/P EST LOW 20 MIN: CPT | Performed by: MIDWIFE

## 2024-02-12 RX ORDER — LORAZEPAM 1 MG/1
1 TABLET ORAL 2 TIMES DAILY PRN
Qty: 60 TABLET | Refills: 0 | Status: SHIPPED | OUTPATIENT
Start: 2024-02-12 | End: 2024-03-13

## 2024-02-28 ENCOUNTER — HOSPITAL ENCOUNTER (OUTPATIENT)
Facility: HOSPITAL | Age: 69
Discharge: HOME | End: 2024-03-02
Payer: MEDICARE

## 2024-02-28 DIAGNOSIS — C34.12 MALIGNANT NEOPLASM OF UPPER LOBE OF LEFT LUNG (HCC): ICD-10-CM

## 2024-02-28 PROCEDURE — 71260 CT THORAX DX C+: CPT

## 2024-02-28 PROCEDURE — 6360000004 HC RX CONTRAST MEDICATION: Performed by: INTERNAL MEDICINE

## 2024-02-28 RX ADMIN — IOPAMIDOL 100 ML: 612 INJECTION, SOLUTION INTRAVENOUS at 09:28

## 2024-03-07 DIAGNOSIS — J44.9 CHRONIC OBSTRUCTIVE PULMONARY DISEASE, UNSPECIFIED (HCC): ICD-10-CM

## 2024-03-07 RX ORDER — FLUTICASONE PROPIONATE AND SALMETEROL XINAFOATE 115; 21 UG/1; UG/1
2 AEROSOL, METERED RESPIRATORY (INHALATION) 2 TIMES DAILY
Qty: 36 G | Refills: 1 | Status: SHIPPED | OUTPATIENT
Start: 2024-03-07

## 2024-03-07 NOTE — TELEPHONE ENCOUNTER
Requested Prescriptions     Pending Prescriptions Disp Refills    ADVAIR -21 MCG/ACT inhaler [Pharmacy Med Name: ADVAIR -21 MCG INHALER] 12 each 5     Sig: TAKE 2 PUFFS BY MOUTH TWICE A DAY     Last seen:  2/7/23 VV

## 2024-03-14 DIAGNOSIS — F41.0 GENERALIZED ANXIETY DISORDER WITH PANIC ATTACKS: ICD-10-CM

## 2024-03-14 DIAGNOSIS — F41.1 GENERALIZED ANXIETY DISORDER WITH PANIC ATTACKS: ICD-10-CM

## 2024-03-14 RX ORDER — LORAZEPAM 1 MG/1
1 TABLET ORAL 2 TIMES DAILY PRN
Qty: 60 TABLET | Refills: 0 | Status: SHIPPED | OUTPATIENT
Start: 2024-03-14 | End: 2024-04-13

## 2024-04-01 ENCOUNTER — OFFICE VISIT (OUTPATIENT)
Age: 69
End: 2024-04-01
Payer: MEDICARE

## 2024-04-01 DIAGNOSIS — C34.92 NON-SMALL CELL CANCER OF LEFT LUNG (HCC): Primary | ICD-10-CM

## 2024-04-01 PROCEDURE — G8399 PT W/DXA RESULTS DOCUMENT: HCPCS | Performed by: INTERNAL MEDICINE

## 2024-04-01 PROCEDURE — G8420 CALC BMI NORM PARAMETERS: HCPCS | Performed by: INTERNAL MEDICINE

## 2024-04-01 PROCEDURE — 4004F PT TOBACCO SCREEN RCVD TLK: CPT | Performed by: INTERNAL MEDICINE

## 2024-04-01 PROCEDURE — 1090F PRES/ABSN URINE INCON ASSESS: CPT | Performed by: INTERNAL MEDICINE

## 2024-04-01 PROCEDURE — 99214 OFFICE O/P EST MOD 30 MIN: CPT | Performed by: INTERNAL MEDICINE

## 2024-04-01 PROCEDURE — G8427 DOCREV CUR MEDS BY ELIG CLIN: HCPCS | Performed by: INTERNAL MEDICINE

## 2024-04-01 PROCEDURE — 1123F ACP DISCUSS/DSCN MKR DOCD: CPT | Performed by: INTERNAL MEDICINE

## 2024-04-01 PROCEDURE — 3017F COLORECTAL CA SCREEN DOC REV: CPT | Performed by: INTERNAL MEDICINE

## 2024-04-01 RX ORDER — TRAZODONE HYDROCHLORIDE 50 MG/1
50 TABLET ORAL NIGHTLY
Qty: 30 TABLET | Refills: 2 | Status: SHIPPED | OUTPATIENT
Start: 2024-04-01 | End: 2024-04-03 | Stop reason: SDUPTHER

## 2024-04-01 NOTE — PROGRESS NOTES
[facility]  Medical Oncology at [facility]  [phone]    Hematology / Oncology Consult    Reason for Visit:   Thalia Arroyo is a 69 y.o. female who is seen in consultation at the request of Dr. Angel for NSCLC    History of Present Illness:   Dear colleague,       Just a brief communication regarding a mutual patient Thalia Arroyo.  As you recall this kind 69-year-old female has a remote history of non-small cell lung cancer status post partial lobectomy in 2009.  More recently she was noted to have a left sided mass lesion which ultimately proved glucose avid.  Biopsies revealed the presence of a squamous cell cancer PD-L1 positive with a TPS greater than 25%.  EBUS was performed and was negative.  Given the patient's significant COPD she was deemed appropriate for SBRT which was pursued.  She would have a T2 N0 M0 lesion.  Her most recent pet imaging studies as well as CT of the chest revealed a diminishing primary site.  In general terms the patient is feeling well and doing well she does have some left shoulder discomfort but has had multiple traumas to the same over the course of many years.  Her appetite is appropriate her weight well-preserved.  Her COPD is baseline per her report.  She denies any worsening cough dyspnea or hemoptysis.  There are no complaints of changes in the quality or character of her voice.  She denies any cephalgia or changes in visual acuity no focal neurologic signs or symptoms.  Her ECOG performance status is 0.  She continues to pursue her activities of daily living generally unencumbered.  She continues to follow-up with our radiation oncology colleagues and will have a CT of the chest performed at the 4-month interval.  Past Medical History:   Diagnosis Date    Coronary artery disease involving native coronary artery     AMI 2005    Essential hypertension 11/18/2022    Hx of cancer of lung 2009    DANE lobectomy    Mixed type COPD (chronic obstructive pulmonary

## 2024-04-03 RX ORDER — TRAZODONE HYDROCHLORIDE 50 MG/1
50 TABLET ORAL NIGHTLY
Qty: 90 TABLET | Refills: 0 | Status: SHIPPED | OUTPATIENT
Start: 2024-04-03 | End: 2024-07-02

## 2024-04-07 ENCOUNTER — APPOINTMENT (OUTPATIENT)
Facility: HOSPITAL | Age: 69
End: 2024-04-07
Payer: MEDICARE

## 2024-04-07 ENCOUNTER — HOSPITAL ENCOUNTER (EMERGENCY)
Facility: HOSPITAL | Age: 69
Discharge: HOME | End: 2024-04-07
Attending: EMERGENCY MEDICINE
Payer: MEDICARE

## 2024-04-07 VITALS
OXYGEN SATURATION: 94 % | WEIGHT: 115 LBS | BODY MASS INDEX: 19.63 KG/M2 | HEIGHT: 64 IN | SYSTOLIC BLOOD PRESSURE: 133 MMHG | DIASTOLIC BLOOD PRESSURE: 63 MMHG | RESPIRATION RATE: 16 BRPM | TEMPERATURE: 97.7 F | HEART RATE: 76 BPM

## 2024-04-07 DIAGNOSIS — I10 ESSENTIAL (PRIMARY) HYPERTENSION: ICD-10-CM

## 2024-04-07 DIAGNOSIS — M94.0 COSTOCHONDRITIS: Primary | ICD-10-CM

## 2024-04-07 DIAGNOSIS — J44.9 CHRONIC OBSTRUCTIVE PULMONARY DISEASE, UNSPECIFIED (HCC): ICD-10-CM

## 2024-04-07 DIAGNOSIS — I25.10 ATHEROSCLEROTIC HEART DISEASE OF NATIVE CORONARY ARTERY WITHOUT ANGINA PECTORIS: ICD-10-CM

## 2024-04-07 DIAGNOSIS — C34.92 NON-SMALL CELL CANCER OF LEFT LUNG (HCC): ICD-10-CM

## 2024-04-07 PROCEDURE — 6370000000 HC RX 637 (ALT 250 FOR IP): Performed by: EMERGENCY MEDICINE

## 2024-04-07 PROCEDURE — 71046 X-RAY EXAM CHEST 2 VIEWS: CPT

## 2024-04-07 PROCEDURE — 99283 EMERGENCY DEPT VISIT LOW MDM: CPT

## 2024-04-07 RX ORDER — LIDOCAINE 4 G/G
1 PATCH TOPICAL DAILY
Qty: 30 PATCH | Refills: 0 | Status: SHIPPED | OUTPATIENT
Start: 2024-04-07 | End: 2024-05-07

## 2024-04-07 RX ORDER — LIDOCAINE 4 G/G
1 PATCH TOPICAL
Status: DISCONTINUED | OUTPATIENT
Start: 2024-04-07 | End: 2024-04-07 | Stop reason: HOSPADM

## 2024-04-07 RX ORDER — OXYCODONE HYDROCHLORIDE 5 MG/1
5 TABLET ORAL ONCE
Status: COMPLETED | OUTPATIENT
Start: 2024-04-07 | End: 2024-04-07

## 2024-04-07 RX ORDER — OXYCODONE HYDROCHLORIDE 5 MG/1
5 TABLET ORAL EVERY 6 HOURS PRN
Qty: 12 TABLET | Refills: 0 | Status: SHIPPED | OUTPATIENT
Start: 2024-04-07 | End: 2024-04-10

## 2024-04-07 RX ADMIN — OXYCODONE HYDROCHLORIDE 5 MG: 5 TABLET ORAL at 14:24

## 2024-04-07 ASSESSMENT — PAIN DESCRIPTION - DESCRIPTORS: DESCRIPTORS: ACHING

## 2024-04-07 ASSESSMENT — PAIN DESCRIPTION - ORIENTATION: ORIENTATION: LEFT

## 2024-04-07 ASSESSMENT — PAIN - FUNCTIONAL ASSESSMENT
PAIN_FUNCTIONAL_ASSESSMENT: ACTIVITIES ARE NOT PREVENTED
PAIN_FUNCTIONAL_ASSESSMENT: 0-10

## 2024-04-07 ASSESSMENT — PAIN SCALES - GENERAL
PAINLEVEL_OUTOF10: 7
PAINLEVEL_OUTOF10: 7

## 2024-04-07 ASSESSMENT — PAIN DESCRIPTION - LOCATION: LOCATION: RIB CAGE

## 2024-04-07 ASSESSMENT — LIFESTYLE VARIABLES
HOW OFTEN DO YOU HAVE A DRINK CONTAINING ALCOHOL: NEVER
HOW MANY STANDARD DRINKS CONTAINING ALCOHOL DO YOU HAVE ON A TYPICAL DAY: PATIENT DOES NOT DRINK

## 2024-04-07 NOTE — ED PROVIDER NOTES
HFA 17 MCG/ACT inhaler  Generic drug: ipratropium  INHALE 2 PUFFS BY MOUTH EVERY 4 HOURS AS NEEDED FOR WHEEZE     carvedilol 25 MG tablet  Commonly known as: COREG  TAKE 1 TABLET BY MOUTH TWICE A DAY WITH MEALS     Cholecalciferol 100 MCG (4000 UT) Caps     clopidogrel 75 MG tablet  Commonly known as: PLAVIX  TAKE 1 TABLET BY MOUTH EVERY DAY     coenzyme Q10 100 MG Caps capsule     cyanocobalamin 250 MCG tablet     gabapentin 300 MG capsule  Commonly known as: NEURONTIN  TAKE 1 CAPSULE BY MOUTH THREE (3) TIMES DAILY. MAX DAILY AMOUNT: 900 MG.     lisinopril 5 MG tablet  Commonly known as: PRINIVIL;ZESTRIL  TAKE 1 TABLET BY MOUTH EVERY DAY     LORazepam 1 MG tablet  Commonly known as: ATIVAN  Take 1 tablet by mouth 2 times daily as needed for Anxiety for up to 30 days. Max Daily Amount: 2 mg     nitroGLYCERIN 0.4 MG SL tablet  Commonly known as: NITROSTAT     traZODone 50 MG tablet  Commonly known as: DESYREL  Take 1 tablet by mouth nightly               Where to Get Your Medications        These medications were sent to [facility]/pharmacy #[id number] [place], [place] - [address] [facility] - P [phone] - F [phone]  [address] [facility] [address]      Phone: [phone]   lidocaine 4 % external patch  oxyCODONE 5 MG immediate release tablet           DISCONTINUED MEDICATIONS:  Discharge Medication List as of 4/7/2024  2:14 PM          I am the Primary Clinician of Record.   Hallie Tejada MD (electronically signed)    (Please note that parts of this dictation were completed with voice recognition software. Quite often unanticipated grammatical, syntax, homophones, and other interpretive errors are inadvertently transcribed by the computer software. Please disregards these errors. Please excuse any errors that have escaped final proofreading.)         Hallie Tejada MD  04/07/24 [id number]

## 2024-04-08 DIAGNOSIS — J44.9 CHRONIC OBSTRUCTIVE PULMONARY DISEASE, UNSPECIFIED (HCC): ICD-10-CM

## 2024-04-08 RX ORDER — LISINOPRIL 5 MG/1
TABLET ORAL
Qty: 90 TABLET | Refills: 0 | Status: SHIPPED | OUTPATIENT
Start: 2024-04-08

## 2024-04-08 RX ORDER — CARVEDILOL 25 MG/1
TABLET ORAL
Qty: 180 TABLET | Refills: 0 | Status: SHIPPED | OUTPATIENT
Start: 2024-04-08

## 2024-04-08 RX ORDER — IPRATROPIUM BROMIDE 17 UG/1
2 AEROSOL, METERED RESPIRATORY (INHALATION) EVERY 4 HOURS PRN
Qty: 12.9 EACH | Refills: 0 | Status: SHIPPED | OUTPATIENT
Start: 2024-04-08

## 2024-04-08 RX ORDER — FLUTICASONE PROPIONATE AND SALMETEROL XINAFOATE 115; 21 UG/1; UG/1
2 AEROSOL, METERED RESPIRATORY (INHALATION) 2 TIMES DAILY
Qty: 36 G | Refills: 1 | Status: SHIPPED | OUTPATIENT
Start: 2024-04-08

## 2024-04-08 NOTE — TELEPHONE ENCOUNTER
Requested Prescriptions     Pending Prescriptions Disp Refills    carvedilol (COREG) 25 MG tablet [Pharmacy Med Name: CARVEDILOL 25 MG TABLET] 180 tablet 3     Sig: TAKE 1 TABLET BY MOUTH TWICE A DAY WITH MEALS    lisinopril (PRINIVIL;ZESTRIL) 5 MG tablet [Pharmacy Med Name: LISINOPRIL 5 MG TABLET] 90 tablet 1     Sig: TAKE 1 TABLET BY MOUTH EVERY DAY    ATROVENT HFA 17 MCG/ACT inhaler [Pharmacy Med Name: ATROVENT 17 MCG HFA INHALER] 12.9 each 5     Sig: INHALE 2 PUFFS BY MOUTH EVERY 4 HOURS AS NEEDED FOR WHEEZING     LOV 11/18/22 (no appointments scheduled)  Last labs 12/7/23 (Outside)    Last refill:  Carvedilol 5/25/23 #180 with 3 RF  Lisinopril 10/24/23 #90 with 1 RF  Atrovent 9/13/23 #12.9 with 5 RF

## 2024-04-08 NOTE — TELEPHONE ENCOUNTER
Requested Prescriptions     Pending Prescriptions Disp Refills    fluticasone-salmeterol (ADVAIR HFA) 115-21 MCG/ACT inhaler 36 g 1     Sig: Inhale 2 puffs into the lungs 2 times daily     LOV (Virtual) 2/7/23  Last labs 12/7/23 (Outside)  Last refill 3/7/24 #36 g with 1 RF

## 2024-04-09 NOTE — BH NOTE
Angelic Torres, SALINA Mercer [facility]  Outpatient Follow Up Medication Management     Collateral: None     Patient Identification: Thalia Arroyo is a 69 year old  female. She lives alone in a rental home. She has two  sons, both of whom have two children. She sees her family about twice a month.  Relocated from [place], [place], along with one of her sons/his family to be near the other son, who relocated here from [place].  She is currently in treatment for recurrence of lung cancer.     Last Visit/interim information: Thalia had finished radiation. She was in the [facility] ED after a fall at home on December 7th; possibly associated with standing quickly after nitroglycerin use. MI ruled out at that time.     Today:     CC: Thalia presents for ongoing medication management of depression, anxiety, and insomnia.     HPI:  Hasn't been feeling well for quite a few weeks now. Thinks she has pneumonia.Was seen in ER and diagnosed with costochondritis but is coughing up yellow/green mucus. She was treated with oxycodone and OTC NSAIDS. She made an appointment for 4/19 with her PCP, whose office is in [place]; this creates hardship, due to lack of transportation.    Is sleeping for an hour or two at a time, wakes up coughing, then back to sleep. Hasn't been taking trazodone due to fear of choking when coughing.     Taking Ativan BID, which is effective for her anxiety.      Her appetite is decreased. Weight is down to 114. She has Ensure at home but hasn't been drinking it.     REVIEW OF SYSTEMS: Pertinent items are noted in the History of Present Illness. All other Systems reviewed and are considered negative.     Current Psychiatric Medications and Dosage:  Trazodone 50 mg HS   Ativan 1 mg BID as needed     Side effects from medications:   Clonazepam caused overstimulation     Allergies: sulfa     Mental Status Examination     I. Reliability in Providing Information: Good      II. Personal

## 2024-04-10 ENCOUNTER — HOSPITAL ENCOUNTER (OUTPATIENT)
Facility: HOSPITAL | Age: 69
Discharge: HOME | End: 2024-04-13
Payer: MEDICARE

## 2024-04-10 DIAGNOSIS — F32.9 MAJOR DEPRESSIVE DISORDER WITHOUT PSYCHOTIC FEATURES: Primary | ICD-10-CM

## 2024-04-10 DIAGNOSIS — F41.0 GENERALIZED ANXIETY DISORDER WITH PANIC ATTACKS: ICD-10-CM

## 2024-04-10 DIAGNOSIS — F51.05 INSOMNIA DUE TO ANXIETY AND FEAR: ICD-10-CM

## 2024-04-10 DIAGNOSIS — F40.9 INSOMNIA DUE TO ANXIETY AND FEAR: ICD-10-CM

## 2024-04-10 DIAGNOSIS — F41.1 GENERALIZED ANXIETY DISORDER WITH PANIC ATTACKS: ICD-10-CM

## 2024-04-10 PROCEDURE — 99213 OFFICE O/P EST LOW 20 MIN: CPT | Performed by: MIDWIFE

## 2024-04-15 DIAGNOSIS — F41.0 GENERALIZED ANXIETY DISORDER WITH PANIC ATTACKS: ICD-10-CM

## 2024-04-15 DIAGNOSIS — F41.1 GENERALIZED ANXIETY DISORDER WITH PANIC ATTACKS: ICD-10-CM

## 2024-04-15 RX ORDER — LORAZEPAM 1 MG/1
1 TABLET ORAL 2 TIMES DAILY PRN
Qty: 60 TABLET | Refills: 2 | Status: SHIPPED | OUTPATIENT
Start: 2024-04-15 | End: 2024-07-14

## 2024-04-18 SDOH — ECONOMIC STABILITY: HOUSING INSECURITY
IN THE LAST 12 MONTHS, WAS THERE A TIME WHEN YOU DID NOT HAVE A STEADY PLACE TO SLEEP OR SLEPT IN A SHELTER (INCLUDING NOW)?: NO

## 2024-04-18 SDOH — ECONOMIC STABILITY: TRANSPORTATION INSECURITY
IN THE PAST 12 MONTHS, HAS LACK OF TRANSPORTATION KEPT YOU FROM MEETINGS, WORK, OR FROM GETTING THINGS NEEDED FOR DAILY LIVING?: YES

## 2024-04-18 SDOH — ECONOMIC STABILITY: FOOD INSECURITY: WITHIN THE PAST 12 MONTHS, THE FOOD YOU BOUGHT JUST DIDN'T LAST AND YOU DIDN'T HAVE MONEY TO GET MORE.: NEVER TRUE

## 2024-04-18 SDOH — ECONOMIC STABILITY: FOOD INSECURITY: WITHIN THE PAST 12 MONTHS, YOU WORRIED THAT YOUR FOOD WOULD RUN OUT BEFORE YOU GOT MONEY TO BUY MORE.: NEVER TRUE

## 2024-04-18 SDOH — ECONOMIC STABILITY: INCOME INSECURITY: HOW HARD IS IT FOR YOU TO PAY FOR THE VERY BASICS LIKE FOOD, HOUSING, MEDICAL CARE, AND HEATING?: SOMEWHAT HARD

## 2024-04-19 ENCOUNTER — OFFICE VISIT (OUTPATIENT)
Dept: FAMILY MEDICINE CLINIC | Age: 69
End: 2024-04-19

## 2024-04-19 VITALS
RESPIRATION RATE: 16 BRPM | BODY MASS INDEX: 19.46 KG/M2 | SYSTOLIC BLOOD PRESSURE: 88 MMHG | DIASTOLIC BLOOD PRESSURE: 55 MMHG | WEIGHT: 114 LBS | TEMPERATURE: 97.3 F | HEART RATE: 71 BPM | HEIGHT: 64 IN | OXYGEN SATURATION: 93 %

## 2024-04-19 DIAGNOSIS — F33.2 MAJOR DEPRESSIVE DISORDER, RECURRENT SEVERE WITHOUT PSYCHOTIC FEATURES (HCC): ICD-10-CM

## 2024-04-19 DIAGNOSIS — E44.1 MILD PROTEIN-CALORIE MALNUTRITION (HCC): ICD-10-CM

## 2024-04-19 DIAGNOSIS — Z00.00 MEDICARE ANNUAL WELLNESS VISIT, SUBSEQUENT: Primary | ICD-10-CM

## 2024-04-19 DIAGNOSIS — I10 ESSENTIAL (PRIMARY) HYPERTENSION: ICD-10-CM

## 2024-04-19 DIAGNOSIS — I25.10 CORONARY ARTERY DISEASE INVOLVING NATIVE CORONARY ARTERY OF NATIVE HEART WITHOUT ANGINA PECTORIS: ICD-10-CM

## 2024-04-19 PROBLEM — C34.12 MALIGNANT NEOPLASM OF UPPER LOBE OF LEFT LUNG (HCC): Status: ACTIVE | Noted: 2023-07-11

## 2024-04-19 PROBLEM — C34.32 MALIGNANT NEOPLASM OF LOWER LOBE OF LEFT LUNG (HCC): Status: ACTIVE | Noted: 2023-05-31

## 2024-04-19 PROBLEM — I25.2 HISTORY OF MYOCARDIAL INFARCTION: Status: ACTIVE | Noted: 2023-06-05

## 2024-04-19 PROBLEM — Z95.5 S/P DRUG ELUTING CORONARY STENT PLACEMENT: Status: ACTIVE | Noted: 2023-06-05

## 2024-04-19 RX ORDER — ACETAMINOPHEN 325 MG/1
TABLET ORAL EVERY 6 HOURS PRN
COMMUNITY

## 2024-04-19 RX ORDER — LISINOPRIL 2.5 MG/1
2.5 TABLET ORAL DAILY
Qty: 90 TABLET | Refills: 1 | Status: SHIPPED | OUTPATIENT
Start: 2024-04-19

## 2024-04-19 RX ORDER — CARVEDILOL 25 MG/1
25 TABLET ORAL 2 TIMES DAILY WITH MEALS
Qty: 180 TABLET | Refills: 0 | Status: SHIPPED | OUTPATIENT
Start: 2024-04-19

## 2024-04-19 RX ORDER — TIOTROPIUM BROMIDE AND OLODATEROL 3.124; 2.736 UG/1; UG/1
SPRAY, METERED RESPIRATORY (INHALATION)
COMMUNITY

## 2024-04-19 SDOH — ECONOMIC STABILITY: FOOD INSECURITY: WITHIN THE PAST 12 MONTHS, THE FOOD YOU BOUGHT JUST DIDN'T LAST AND YOU DIDN'T HAVE MONEY TO GET MORE.: NEVER TRUE

## 2024-04-19 SDOH — ECONOMIC STABILITY: FOOD INSECURITY: WITHIN THE PAST 12 MONTHS, YOU WORRIED THAT YOUR FOOD WOULD RUN OUT BEFORE YOU GOT MONEY TO BUY MORE.: NEVER TRUE

## 2024-04-19 SDOH — ECONOMIC STABILITY: INCOME INSECURITY: HOW HARD IS IT FOR YOU TO PAY FOR THE VERY BASICS LIKE FOOD, HOUSING, MEDICAL CARE, AND HEATING?: NOT HARD AT ALL

## 2024-04-19 ASSESSMENT — PATIENT HEALTH QUESTIONNAIRE - PHQ9
SUM OF ALL RESPONSES TO PHQ QUESTIONS 1-9: 0
3. TROUBLE FALLING OR STAYING ASLEEP: NEARLY EVERY DAY
SUM OF ALL RESPONSES TO PHQ9 QUESTIONS 1 & 2: 0
1. LITTLE INTEREST OR PLEASURE IN DOING THINGS: NOT AT ALL
9. THOUGHTS THAT YOU WOULD BE BETTER OFF DEAD, OR OF HURTING YOURSELF: NOT AT ALL
8. MOVING OR SPEAKING SO SLOWLY THAT OTHER PEOPLE COULD HAVE NOTICED. OR THE OPPOSITE, BEING SO FIGETY OR RESTLESS THAT YOU HAVE BEEN MOVING AROUND A LOT MORE THAN USUAL: NOT AT ALL
SUM OF ALL RESPONSES TO PHQ QUESTIONS 1-9: 0
SUM OF ALL RESPONSES TO PHQ QUESTIONS 1-9: 7
6. FEELING BAD ABOUT YOURSELF - OR THAT YOU ARE A FAILURE OR HAVE LET YOURSELF OR YOUR FAMILY DOWN: NOT AT ALL
SUM OF ALL RESPONSES TO PHQ QUESTIONS 1-9: 7
SUM OF ALL RESPONSES TO PHQ QUESTIONS 1-9: 7
SUM OF ALL RESPONSES TO PHQ QUESTIONS 1-9: 0
SUM OF ALL RESPONSES TO PHQ QUESTIONS 1-9: 7
SUM OF ALL RESPONSES TO PHQ QUESTIONS 1-9: 0
2. FEELING DOWN, DEPRESSED OR HOPELESS: NOT AT ALL
5. POOR APPETITE OR OVEREATING: SEVERAL DAYS
4. FEELING TIRED OR HAVING LITTLE ENERGY: NEARLY EVERY DAY
7. TROUBLE CONCENTRATING ON THINGS, SUCH AS READING THE NEWSPAPER OR WATCHING TELEVISION: NOT AT ALL
1. LITTLE INTEREST OR PLEASURE IN DOING THINGS: NOT AT ALL
SUM OF ALL RESPONSES TO PHQ9 QUESTIONS 1 & 2: 0
2. FEELING DOWN, DEPRESSED OR HOPELESS: NOT AT ALL
10. IF YOU CHECKED OFF ANY PROBLEMS, HOW DIFFICULT HAVE THESE PROBLEMS MADE IT FOR YOU TO DO YOUR WORK, TAKE CARE OF THINGS AT HOME, OR GET ALONG WITH OTHER PEOPLE: NOT DIFFICULT AT ALL

## 2024-04-19 NOTE — PROGRESS NOTES
4/19/2024      Chief Complaint   Patient presents with    Medicare AWV    Hypertension    Shoulder Pain     Right         History of Present Illness:         is a 69 y.o. female returns to follow up HTN, CAD, COPD. Completed radiation for de brenda L lung cancer, now under every 4 month CT surveillance. Has costochondritis worse with coughing. Mood is OK, followed by behavioral health. Still smoking.      Allergies   Allergen Reactions    Sulfa Antibiotics Hives       Current Outpatient Medications   Medication Sig Dispense Refill    acetaminophen (TYLENOL) 325 MG tablet Take by mouth every 6 hours as needed      carvedilol (COREG) 25 MG tablet Take 1 tablet by mouth 2 times daily (with meals) 180 tablet 0    lisinopril (PRINIVIL;ZESTRIL) 2.5 MG tablet Take 1 tablet by mouth daily 90 tablet 1    LORazepam (ATIVAN) 1 MG tablet Take 1 tablet by mouth 2 times daily as needed for Anxiety for up to 90 days. Max Daily Amount: 2 mg 60 tablet 2    ATROVENT HFA 17 MCG/ACT inhaler INHALE 2 PUFFS BY MOUTH EVERY 4 HOURS AS NEEDED FOR WHEEZING 12.9 each 0    fluticasone-salmeterol (ADVAIR HFA) 115-21 MCG/ACT inhaler Inhale 2 puffs into the lungs 2 times daily 36 g 1    albuterol sulfate HFA (PROVENTIL;VENTOLIN;PROAIR) 108 (90 Base) MCG/ACT inhaler INHALE 2 PUFFS INTO THE LUNGS 4 TIMES DAILY AS NEEDED FOR WHEEZING. 8.5 each 5    clopidogrel (PLAVIX) 75 MG tablet TAKE 1 TABLET BY MOUTH EVERY DAY 90 tablet 1    atorvastatin (LIPITOR) 40 MG tablet TAKE 1 TABLET BY MOUTH EVERY DAY 90 tablet 1    gabapentin (NEURONTIN) 300 MG capsule TAKE 1 CAPSULE BY MOUTH THREE (3) TIMES DAILY. MAX DAILY AMOUNT: 900 MG. (Patient taking differently: Take 1 capsule by mouth 3 times daily.) 270 capsule 5    Cholecalciferol 100 MCG (4000 UT) CAPS Take 1 tablet by mouth daily      aspirin 81 MG EC tablet Take by mouth daily      coenzyme Q10 100 MG CAPS capsule Take 1 capsule by mouth daily      nitroGLYCERIN (NITROSTAT) 0.4 MG SL tablet Place 1

## 2024-04-19 NOTE — PROGRESS NOTES
Medicare Annual Wellness Visit    Thalia Arroyo is here for Medicare AWV, Hypertension, and Shoulder Pain (Right)    Assessment & Plan   Medicare annual wellness visit, subsequent  Recommendations for Preventive Services Due: see orders and patient instructions/AVS.  Recommended screening schedule for the next 5-10 years is provided to the patient in written form: see Patient Instructions/AVS.     No follow-ups on file.     Subjective       Patient's complete Health Risk Assessment and screening values have been reviewed and are found in Flowsheets. The following problems were reviewed today and where indicated follow up appointments were made and/or referrals ordered.    Positive Risk Factor Screenings with Interventions:        Depression:  PHQ-2 Score: 0  PHQ-9 Total Score: 7    Interpretation:  5-9 mild   10-14 moderate   15-19 moderately severe   20-27 severe     Interventions:  Monitored by specialist. No acute findings meriting change in the plan         Self-assessment of health:  In general, how would you say your health is?: (!) Poor    Interventions:  Patient declines any further evaluation or treatment    General HRA Questions:  Select all that apply: (!) New or Increased Fatigue, Loneliness    Fatigue Interventions:  Patient declined any further interventions or treatment    Loneliness Interventions:  See above      Activity, Diet, and Weight:  On average, how many days per week do you engage in moderate to strenuous exercise (like a brisk walk)?: 0 days  On average, how many minutes do you engage in exercise at this level?: 0 min    Do you eat balanced/healthy meals regularly?: (!) No    Body mass index is 19.57 kg/m².      Inactivity Interventions:  Patient declined any further interventions or treatment  Do you eat balanced/healthy meals regularly Interventions:  Patient declines any further evaluation or treatment        Dentist Screen:  Have you seen the dentist within the past year?: (!)

## 2024-04-19 NOTE — PROGRESS NOTES
\"Have you been to the ER, urgent care clinic since your last visit?  Hospitalized since your last visit?\"     Yes -   [facility] ER 4/7/24 -  Rib Pain (Costochondritis)   [facility] ER 12/7/23 - Chest Pain (Fall)       “Have you seen or consulted any other health care providers outside of [facility] since your last visit?”     Yes -   [facility] - 4/1/24  [facility] 3/20/24  [facility] 2/23/24      Have you had a mammogram?”   NO - Declines    Date of last Mammogram: 11/15/2018         “Have you had a colorectal cancer screening such as a colonoscopy/FIT/Cologuard?    NO -  Unsure if she is able to have one due to her health issues     No colonoscopy on file  No cologuard on file  No FIT/FOBT on file   No flexible sigmoidoscopy on file         Click Here for Release of Records Request

## 2024-04-20 LAB
ALBUMIN SERPL-MCNC: 3.2 G/DL (ref 3.5–5)
ALBUMIN/GLOB SERPL: 0.8 (ref 1.1–2.2)
ALP SERPL-CCNC: 117 U/L (ref 45–117)
ALT SERPL-CCNC: 33 U/L (ref 12–78)
ANION GAP SERPL CALC-SCNC: 7 MMOL/L (ref 5–15)
AST SERPL-CCNC: 20 U/L (ref 15–37)
BASOPHILS # BLD: 0 K/UL (ref 0–0.1)
BASOPHILS NFR BLD: 0 % (ref 0–1)
BILIRUB SERPL-MCNC: 0.4 MG/DL (ref 0.2–1)
BUN SERPL-MCNC: 11 MG/DL (ref 6–20)
BUN/CREAT SERPL: 16 (ref 12–20)
CALCIUM SERPL-MCNC: 9.3 MG/DL (ref 8.5–10.1)
CHLORIDE SERPL-SCNC: 108 MMOL/L (ref 97–108)
CHOLEST SERPL-MCNC: 93 MG/DL
CO2 SERPL-SCNC: 23 MMOL/L (ref 21–32)
CREAT SERPL-MCNC: 0.67 MG/DL (ref 0.55–1.02)
DIFFERENTIAL METHOD BLD: ABNORMAL
EOSINOPHIL # BLD: 0.1 K/UL (ref 0–0.4)
EOSINOPHIL NFR BLD: 1 % (ref 0–7)
ERYTHROCYTE [DISTWIDTH] IN BLOOD BY AUTOMATED COUNT: 12.7 % (ref 11.5–14.5)
GLOBULIN SER CALC-MCNC: 3.8 G/DL (ref 2–4)
GLUCOSE SERPL-MCNC: 105 MG/DL (ref 65–100)
HCT VFR BLD AUTO: 33.3 % (ref 35–47)
HDLC SERPL-MCNC: 34 MG/DL
HDLC SERPL: 2.7 (ref 0–5)
HGB BLD-MCNC: 11.3 G/DL (ref 11.5–16)
IMM GRANULOCYTES # BLD AUTO: 0 K/UL (ref 0–0.04)
IMM GRANULOCYTES NFR BLD AUTO: 0 % (ref 0–0.5)
LDLC SERPL CALC-MCNC: 36.4 MG/DL (ref 0–100)
LYMPHOCYTES # BLD: 2.3 K/UL (ref 0.8–3.5)
LYMPHOCYTES NFR BLD: 23 % (ref 12–49)
MCH RBC QN AUTO: 31 PG (ref 26–34)
MCHC RBC AUTO-ENTMCNC: 33.9 G/DL (ref 30–36.5)
MCV RBC AUTO: 91.5 FL (ref 80–99)
MONOCYTES # BLD: 0.8 K/UL (ref 0–1)
MONOCYTES NFR BLD: 8 % (ref 5–13)
NEUTS SEG # BLD: 6.9 K/UL (ref 1.8–8)
NEUTS SEG NFR BLD: 68 % (ref 32–75)
NRBC # BLD: 0 K/UL (ref 0–0.01)
NRBC BLD-RTO: 0 PER 100 WBC
PLATELET # BLD AUTO: 178 K/UL (ref 150–400)
PMV BLD AUTO: 10.4 FL (ref 8.9–12.9)
POTASSIUM SERPL-SCNC: 4 MMOL/L (ref 3.5–5.1)
PROT SERPL-MCNC: 7 G/DL (ref 6.4–8.2)
RBC # BLD AUTO: 3.64 M/UL (ref 3.8–5.2)
SODIUM SERPL-SCNC: 138 MMOL/L (ref 136–145)
TRIGL SERPL-MCNC: 113 MG/DL
VLDLC SERPL CALC-MCNC: 22.6 MG/DL
WBC # BLD AUTO: 10.2 K/UL (ref 3.6–11)

## 2024-04-22 DIAGNOSIS — J44.9 CHRONIC OBSTRUCTIVE PULMONARY DISEASE, UNSPECIFIED (HCC): ICD-10-CM

## 2024-04-22 DIAGNOSIS — M54.40 LUMBAGO WITH SCIATICA, UNSPECIFIED SIDE: ICD-10-CM

## 2024-04-22 RX ORDER — GABAPENTIN 300 MG/1
CAPSULE ORAL
Qty: 270 CAPSULE | Refills: 3 | Status: SHIPPED | OUTPATIENT
Start: 2024-04-22 | End: 2024-05-22

## 2024-04-22 RX ORDER — IPRATROPIUM BROMIDE 17 UG/1
AEROSOL, METERED RESPIRATORY (INHALATION)
Qty: 12.9 EACH | Refills: 5 | Status: SHIPPED | OUTPATIENT
Start: 2024-04-22

## 2024-04-22 NOTE — TELEPHONE ENCOUNTER
Requested Prescriptions     Pending Prescriptions Disp Refills    ATROVENT HFA 17 MCG/ACT inhaler [Pharmacy Med Name: ATROVENT 17 MCG HFA INHALER] 12.9 each 0     Sig: TAKE 2 PUFFS BY MOUTH EVERY 4 HOURS AS NEEDED FOR WHEEZE    gabapentin (NEURONTIN) 300 MG capsule [Pharmacy Med Name: GABAPENTIN 300 MG CAPSULE] 270 capsule 0     Sig: TAKE 1 CAPSULE BY MOUTH THREE (3) TIMES DAILY. MAX DAILY AMOUNT: 900 MG.     Last seen:  4/19/24

## 2024-05-15 ENCOUNTER — OFFICE VISIT (OUTPATIENT)
Age: 69
End: 2024-05-15
Payer: MEDICARE

## 2024-05-15 VITALS
SYSTOLIC BLOOD PRESSURE: 109 MMHG | DIASTOLIC BLOOD PRESSURE: 67 MMHG | BODY MASS INDEX: 19.5 KG/M2 | RESPIRATION RATE: 18 BRPM | TEMPERATURE: 97 F | HEIGHT: 64 IN | WEIGHT: 114.25 LBS | HEART RATE: 66 BPM | OXYGEN SATURATION: 94 %

## 2024-05-15 DIAGNOSIS — F41.1 GENERALIZED ANXIETY DISORDER WITH PANIC ATTACKS: ICD-10-CM

## 2024-05-15 DIAGNOSIS — M54.16 LUMBAR RADICULOPATHY: ICD-10-CM

## 2024-05-15 DIAGNOSIS — J44.1 COPD EXACERBATION (HCC): ICD-10-CM

## 2024-05-15 DIAGNOSIS — F33.2 MAJOR DEPRESSIVE DISORDER, RECURRENT SEVERE WITHOUT PSYCHOTIC FEATURES (HCC): ICD-10-CM

## 2024-05-15 DIAGNOSIS — F41.0 GENERALIZED ANXIETY DISORDER WITH PANIC ATTACKS: ICD-10-CM

## 2024-05-15 DIAGNOSIS — C34.32 MALIGNANT NEOPLASM OF LOWER LOBE OF LEFT LUNG (HCC): Primary | ICD-10-CM

## 2024-05-15 DIAGNOSIS — M94.0 COSTOCHONDRITIS: ICD-10-CM

## 2024-05-15 DIAGNOSIS — I25.10 CORONARY ARTERY DISEASE INVOLVING NATIVE HEART WITHOUT ANGINA PECTORIS, UNSPECIFIED VESSEL OR LESION TYPE: ICD-10-CM

## 2024-05-15 PROCEDURE — 1123F ACP DISCUSS/DSCN MKR DOCD: CPT | Performed by: NURSE PRACTITIONER

## 2024-05-15 PROCEDURE — 4004F PT TOBACCO SCREEN RCVD TLK: CPT | Performed by: NURSE PRACTITIONER

## 2024-05-15 PROCEDURE — 3078F DIAST BP <80 MM HG: CPT | Performed by: NURSE PRACTITIONER

## 2024-05-15 PROCEDURE — G8427 DOCREV CUR MEDS BY ELIG CLIN: HCPCS | Performed by: NURSE PRACTITIONER

## 2024-05-15 PROCEDURE — 3017F COLORECTAL CA SCREEN DOC REV: CPT | Performed by: NURSE PRACTITIONER

## 2024-05-15 PROCEDURE — 1090F PRES/ABSN URINE INCON ASSESS: CPT | Performed by: NURSE PRACTITIONER

## 2024-05-15 PROCEDURE — 3074F SYST BP LT 130 MM HG: CPT | Performed by: NURSE PRACTITIONER

## 2024-05-15 PROCEDURE — G8420 CALC BMI NORM PARAMETERS: HCPCS | Performed by: NURSE PRACTITIONER

## 2024-05-15 PROCEDURE — G8399 PT W/DXA RESULTS DOCUMENT: HCPCS | Performed by: NURSE PRACTITIONER

## 2024-05-15 PROCEDURE — 3023F SPIROM DOC REV: CPT | Performed by: NURSE PRACTITIONER

## 2024-05-15 PROCEDURE — 99214 OFFICE O/P EST MOD 30 MIN: CPT | Performed by: NURSE PRACTITIONER

## 2024-05-15 RX ORDER — PREDNISONE 10 MG/1
TABLET ORAL
Qty: 21 TABLET | Refills: 0 | Status: SHIPPED | OUTPATIENT
Start: 2024-05-15

## 2024-05-15 RX ORDER — LEVOFLOXACIN 500 MG/1
500 TABLET, FILM COATED ORAL DAILY
Qty: 7 TABLET | Refills: 0 | Status: SHIPPED | OUTPATIENT
Start: 2024-05-15 | End: 2024-05-22

## 2024-05-15 ASSESSMENT — PATIENT HEALTH QUESTIONNAIRE - PHQ9
1. LITTLE INTEREST OR PLEASURE IN DOING THINGS: NOT AT ALL
SUM OF ALL RESPONSES TO PHQ QUESTIONS 1-9: 0
2. FEELING DOWN, DEPRESSED OR HOPELESS: NOT AT ALL
SUM OF ALL RESPONSES TO PHQ QUESTIONS 1-9: 0
SUM OF ALL RESPONSES TO PHQ9 QUESTIONS 1 & 2: 0
SUM OF ALL RESPONSES TO PHQ QUESTIONS 1-9: 0
SUM OF ALL RESPONSES TO PHQ QUESTIONS 1-9: 0

## 2024-05-15 NOTE — PROGRESS NOTES
Subjective:     CC: COPD, lung cancer    Thalia Arroyo is a 69 y.o. female who presents today to discuss COPD and lung cancer.    She is transferring her care from the heart failure clinic. It is closer to her house. Previously saw Dr. Angel. Prior to this she lived in [place]. Her sons live in [place].     Lung Cancer, left lung  Dx'd back in [place] in 2009 and underwent left lobectomy. She was in remission for a while. States now \"it's back.\"   Just completed radiation.  Now undergoing CT scans every 4 months for surveillance. She was previously followed by oncologist Dr Luo, now seeing another oncologist Kina Askew and pulmonologist Dr Stewart, both with [facility].   Still smoking.     Costochondritis   She reports chronic pain all over her chest.  Worse with coughing. Started a couple of months ago. She first went to the ER for this problem and this is where she was diagnosed.       COPD  No longer on Advair. Currently on Stiolto Respimat daily but she does not like it. Lately she has been using her albuterol BID and Atrovent QID. Lately she has not been able to sleep laying down due so much  phlegm build-up in her chest.  Has been more short of breath than usual.  Denies fever or chills. Still smoking.     CAD  She had a MI in 2005. Had 2 stents placed. Has not seen a cardiologist since she moved here from [place]. Has to rely on public transportation.  She is on Carvedilol, Lisinopril, Lipitor, Plavix, and aspirin.    Avoids NSAIDS  Denies any signs or symptoms of abnormal bleeding.  Still smoking.   Does not get much exercise.   Will refer to Dr. Cavazos today.    Major depression with YUNI  Followed by behavioral health specialist Angelic Torres NP.  She takes Ativan 1 mg twice a day as needed.  She takes trazodone 50 mg at bedtime.  Since her diagnosis these medicines have really helped a lot.    Lumbar DDD with radiculopathy  Hx of discectomy in 1998.   She takes gabapentin 300 mg 3 times daily for neuropathy in her

## 2024-05-15 NOTE — PROGRESS NOTES
\"Have you been to the ER, urgent care clinic since your last visit?  Hospitalized since your last visit?\"    NO    “Have you seen or consulted any other health care providers outside of [facility] since your last visit?”    NO    Have you had a mammogram?”   NO    Date of last Mammogram: 11/15/2018         “Have you had a colorectal cancer screening such as a colonoscopy/FIT/Cologuard?    NO will check on that    No colonoscopy on file  No cologuard on file  No FIT/FOBT on file   No flexible sigmoidoscopy on file         Click Here for Release of Records Request

## 2024-05-16 DIAGNOSIS — I25.10 ATHEROSCLEROTIC HEART DISEASE OF NATIVE CORONARY ARTERY WITHOUT ANGINA PECTORIS: ICD-10-CM

## 2024-05-16 RX ORDER — CLOPIDOGREL BISULFATE 75 MG/1
TABLET ORAL
Qty: 90 TABLET | Refills: 1 | Status: SHIPPED | OUTPATIENT
Start: 2024-05-16

## 2024-05-16 NOTE — TELEPHONE ENCOUNTER
Requested Prescriptions     Pending Prescriptions Disp Refills    clopidogrel (PLAVIX) 75 MG tablet [Pharmacy Med Name: CLOPIDOGREL 75 MG TABLET] 90 tablet 1     Sig: TAKE 1 TABLET BY MOUTH EVERY DAY     Last seen:  5/15/24

## 2024-05-29 ENCOUNTER — TRANSCRIBE ORDERS (OUTPATIENT)
Facility: HOSPITAL | Age: 69
End: 2024-05-29

## 2024-05-29 DIAGNOSIS — C34.12 MALIGNANT NEOPLASM OF UPPER LOBE BRONCHUS, LEFT (HCC): Primary | ICD-10-CM

## 2024-06-04 PROBLEM — I25.10 CORONARY ARTERY DISEASE INVOLVING NATIVE CORONARY ARTERY OF NATIVE HEART WITHOUT ANGINA PECTORIS: Status: ACTIVE | Noted: 2022-11-18

## 2024-06-04 PROBLEM — E78.00 HYPERCHOLESTEROLEMIA: Status: ACTIVE | Noted: 2024-06-04

## 2024-06-04 NOTE — PROGRESS NOTES
ASSESSMENT and PLAN  1. Coronary artery disease involving native coronary artery of native heart without angina pectoris  Status post MI with subsequent LAD PCI/MEHDI and ramus PCI/MEHDI in 2005.  No ischemia Jeanne Cardiolite 6/8/2023.  Free of angina.  She can discontinue the clopidogrel.  Continue aspirin daily indefinitely.  Continue risk factor modification efforts.    2. Essential hypertension  Well-controlled.  No changes recommended.    3. Hypercholesterolemia  Labs 4/19/2024 demonstrated total cholesterol 93, triglyceride 113, HDL 34, LDL 36.  Continue atorvastatin as prescribed.    4. Tobacco use disorder, continuous  Complete tobacco cessation recommended.  It's unlikely she will quit.      I would like to thank Marialuisa for this referral.  Please contact me if questions or concerns arise.      Follow-up in 1 year.  The patient has been instructed and agrees to call our office with any issues or other concerns related to their cardiac condition(s) and/or complaint(s).      CHIEF COMPLAINT  Here to establish cardiology care    HPI:    Thalia Arroyo is a 69 y.o. female  referred by Marialuisa Costa NP for consultation regarding coronary artery disease.  She has a history of MI and 2 stents in 2005 while living in [facility].  She relocated to [place] and was followed yearly by a cardiologist there.  She relocated to [place] about 2 years ago and has not had any cardiology follow-up since she left [place].  She has not had a recurrent MI or repeat catheterization since 2005.  She had a Lexiscan Cardiolite study 6/8/2023 demonstrating no ischemia or infarction and EF 69%.  She has recurrent lung cancer and recently completed radiation therapy.  She is not receiving chemotherapy.  She states she \"continues to smoke because her lung cancer is inoperable\".  She has not experienced recurrent chest pain like she experienced at the time of her MI.  She has had costochondritis in the past.  She currently

## 2024-06-13 ENCOUNTER — OFFICE VISIT (OUTPATIENT)
Age: 69
End: 2024-06-13
Payer: MEDICARE

## 2024-06-13 VITALS
WEIGHT: 112 LBS | DIASTOLIC BLOOD PRESSURE: 60 MMHG | HEART RATE: 75 BPM | OXYGEN SATURATION: 94 % | HEIGHT: 64 IN | TEMPERATURE: 97 F | SYSTOLIC BLOOD PRESSURE: 110 MMHG | BODY MASS INDEX: 19.12 KG/M2 | RESPIRATION RATE: 20 BRPM

## 2024-06-13 DIAGNOSIS — E78.00 HYPERCHOLESTEROLEMIA: ICD-10-CM

## 2024-06-13 DIAGNOSIS — I25.10 CORONARY ARTERY DISEASE INVOLVING NATIVE CORONARY ARTERY OF NATIVE HEART WITHOUT ANGINA PECTORIS: Primary | ICD-10-CM

## 2024-06-13 DIAGNOSIS — I10 ESSENTIAL HYPERTENSION: ICD-10-CM

## 2024-06-13 DIAGNOSIS — F17.209 TOBACCO USE DISORDER, CONTINUOUS: ICD-10-CM

## 2024-06-13 PROCEDURE — 1123F ACP DISCUSS/DSCN MKR DOCD: CPT | Performed by: INTERNAL MEDICINE

## 2024-06-13 PROCEDURE — 3017F COLORECTAL CA SCREEN DOC REV: CPT | Performed by: INTERNAL MEDICINE

## 2024-06-13 PROCEDURE — 4004F PT TOBACCO SCREEN RCVD TLK: CPT | Performed by: INTERNAL MEDICINE

## 2024-06-13 PROCEDURE — G8428 CUR MEDS NOT DOCUMENT: HCPCS | Performed by: INTERNAL MEDICINE

## 2024-06-13 PROCEDURE — 1090F PRES/ABSN URINE INCON ASSESS: CPT | Performed by: INTERNAL MEDICINE

## 2024-06-13 PROCEDURE — 93000 ELECTROCARDIOGRAM COMPLETE: CPT | Performed by: INTERNAL MEDICINE

## 2024-06-13 PROCEDURE — 3074F SYST BP LT 130 MM HG: CPT | Performed by: INTERNAL MEDICINE

## 2024-06-13 PROCEDURE — 99204 OFFICE O/P NEW MOD 45 MIN: CPT | Performed by: INTERNAL MEDICINE

## 2024-06-13 PROCEDURE — G8399 PT W/DXA RESULTS DOCUMENT: HCPCS | Performed by: INTERNAL MEDICINE

## 2024-06-13 PROCEDURE — G8420 CALC BMI NORM PARAMETERS: HCPCS | Performed by: INTERNAL MEDICINE

## 2024-06-13 PROCEDURE — 3078F DIAST BP <80 MM HG: CPT | Performed by: INTERNAL MEDICINE

## 2024-06-13 ASSESSMENT — PATIENT HEALTH QUESTIONNAIRE - PHQ9
SUM OF ALL RESPONSES TO PHQ QUESTIONS 1-9: 0
SUM OF ALL RESPONSES TO PHQ QUESTIONS 1-9: 0
1. LITTLE INTEREST OR PLEASURE IN DOING THINGS: NOT AT ALL
SUM OF ALL RESPONSES TO PHQ QUESTIONS 1-9: 0
SUM OF ALL RESPONSES TO PHQ QUESTIONS 1-9: 0

## 2024-06-13 NOTE — PROGRESS NOTES
Identified pt with two pt identifiers(name and ). Reviewed record in preparation for visit and have obtained necessary documentation.  Chief Complaint   Patient presents with    New Patient    Coronary Artery Disease    Hypertension      /60 (Site: Left Upper Arm, Position: Sitting, Cuff Size: Medium Adult)   Pulse 75   Temp 97 °F (36.1 °C) (Temporal)   Resp 20   Ht 1.626 m (5' 4\")   Wt 50.8 kg (112 lb)   SpO2 94%   BMI 19.22 kg/m²       Medications reviewed/approved by provider.      Health Maintenance Review: Patient reminded of \"due or due soon\" health maintenance. I have asked the patient to contact his/her primary care provider (PCP) for follow-up on his/her health maintenance.    Coordination of Care Questionnaire:  :   1) Have you been to an emergency room, urgent care, or hospitalized since your last visit?  If yes, where when, and reason for visit? no       2. Have seen or consulted any other health care provider since your last visit?   If yes, where when, and reason for visit?  no      Patient is accompanied by self I have received verbal consent from Thalia Arroyo to discuss any/all medical information while they are present in the room.

## 2024-06-18 DIAGNOSIS — I10 ESSENTIAL (PRIMARY) HYPERTENSION: ICD-10-CM

## 2024-06-18 RX ORDER — LISINOPRIL 5 MG/1
TABLET ORAL
Qty: 90 TABLET | Refills: 0 | OUTPATIENT
Start: 2024-06-18

## 2024-06-18 NOTE — TELEPHONE ENCOUNTER
Requested Prescriptions     Pending Prescriptions Disp Refills    lisinopril (PRINIVIL;ZESTRIL) 5 MG tablet [Pharmacy Med Name: LISINOPRIL 5 MG TABLET] 90 tablet 0     Sig: TAKE 1 TABLET BY MOUTH EVERY DAY     Last seen:  5/15/24    Last filled:    lisinopril (PRINIVIL;ZESTRIL) 2.5 MG tablet [[id number]]    Order Details  Dose: 2.5 mg Route: Oral Frequency: DAILY   Dispense Quantity: 90 tablet Refills: 1          Sig: Take 1 tablet by mouth daily         Start Date: 04/19/24       Patient given enough medication through October 2024.  I have called and spoke to this patient and gave her this information.  She tells me that she has lost the bottle and does not know where to find it.  She does however have some more of the 5 mg and she can cut them in half.  She will let us know if she runs out before she can get her refill.

## 2024-07-03 ENCOUNTER — HOSPITAL ENCOUNTER (OUTPATIENT)
Facility: HOSPITAL | Age: 69
Discharge: HOME | End: 2024-07-06
Payer: MEDICARE

## 2024-07-03 DIAGNOSIS — C34.12 MALIGNANT NEOPLASM OF UPPER LOBE BRONCHUS, LEFT (HCC): ICD-10-CM

## 2024-07-03 PROCEDURE — 74177 CT ABD & PELVIS W/CONTRAST: CPT

## 2024-07-03 PROCEDURE — 71260 CT THORAX DX C+: CPT

## 2024-07-03 PROCEDURE — 6360000004 HC RX CONTRAST MEDICATION: Performed by: NURSE PRACTITIONER

## 2024-07-03 RX ADMIN — IOPAMIDOL 100 ML: 755 INJECTION, SOLUTION INTRAVENOUS at 09:37

## 2024-07-03 RX ADMIN — IOHEXOL 50 ML: 240 INJECTION, SOLUTION INTRATHECAL; INTRAVASCULAR; INTRAVENOUS; ORAL at 08:47

## 2024-07-09 ENCOUNTER — HOSPITAL ENCOUNTER (OUTPATIENT)
Facility: HOSPITAL | Age: 69
Discharge: HOME | End: 2024-07-12
Payer: MEDICARE

## 2024-07-09 DIAGNOSIS — F32.9 MAJOR DEPRESSIVE DISORDER WITHOUT PSYCHOTIC FEATURES: Primary | ICD-10-CM

## 2024-07-09 DIAGNOSIS — F41.1 GENERALIZED ANXIETY DISORDER WITH PANIC ATTACKS: ICD-10-CM

## 2024-07-09 DIAGNOSIS — F41.0 GENERALIZED ANXIETY DISORDER WITH PANIC ATTACKS: ICD-10-CM

## 2024-07-09 PROCEDURE — 99214 OFFICE O/P EST MOD 30 MIN: CPT | Performed by: MIDWIFE

## 2024-07-09 NOTE — BH NOTE
Angelic Torres, SALINA  [facility]  Outpatient Follow Up Medication Management     Collateral: None     Patient Identification: Thalia Arroyo is a 69 year old  female. She lives alone in a rental home. She has two  sons, both of whom have two children. She sees her family about twice a month.  Relocated from [place], [place], along with one of her sons/his family to be near the other son, who relocated here from [place].  She is currently in treatment for recurrence of lung cancer.     Last Visit/interim information: Thalia had finished radiation. She was in the [facility] ED after a fall at home on December 7th; possibly associated with standing quickly after nitroglycerin use. MI ruled out at that time.  7/3/2023: CT of abdomen and  lung which Thalia states \"look pretty good.\" Follow up is tomorrow in [place].      Today:     CC: Thalia presents for ongoing medication management of depression, anxiety, and insomnia.    Her DILs mother passed in [place]. Thalia watched the kids for two weeks and was exhausted afterwards. Ages 2 and 4. They are basically undisciplined by her son and DIL, so it was tough on her.     Costochondritis is improved, she did not have pneumonia.      Sleeping well, usually 7 hours; she thinks she is more at peace. She had been feeling unloved but is feeling more accepting of her boys having their own lives. Is not taking trazodone.     Taking Ativan up to 2 mg daily, which is effective for her anxiety.      Her appetite is improved now. Lost 9 pounds while watching the grandchildren. Weight is back up to 115. She has Ensure at home but hasn't been drinking it.     REVIEW OF SYSTEMS: Pertinent items are noted in the History of Present Illness. All other Systems reviewed and are considered negative.     Side effects from medications:   Clonazepam caused overstimulation     Allergies: sulfa     Mental Status Examination     I. Reliability in Providing Information:

## 2024-07-10 DIAGNOSIS — F41.1 GENERALIZED ANXIETY DISORDER WITH PANIC ATTACKS: ICD-10-CM

## 2024-07-10 DIAGNOSIS — F41.0 GENERALIZED ANXIETY DISORDER WITH PANIC ATTACKS: ICD-10-CM

## 2024-07-10 RX ORDER — LORAZEPAM 1 MG/1
1 TABLET ORAL 2 TIMES DAILY PRN
Qty: 60 TABLET | Refills: 2 | Status: SHIPPED | OUTPATIENT
Start: 2024-07-10 | End: 2024-10-08

## 2024-08-01 ENCOUNTER — OFFICE VISIT (OUTPATIENT)
Age: 69
End: 2024-08-01
Payer: MEDICARE

## 2024-08-01 VITALS
BODY MASS INDEX: 19.6 KG/M2 | HEART RATE: 65 BPM | WEIGHT: 110.6 LBS | DIASTOLIC BLOOD PRESSURE: 65 MMHG | RESPIRATION RATE: 16 BRPM | SYSTOLIC BLOOD PRESSURE: 111 MMHG | OXYGEN SATURATION: 94 % | HEIGHT: 63 IN | TEMPERATURE: 97.5 F

## 2024-08-01 DIAGNOSIS — C34.92 NON-SMALL CELL CANCER OF LEFT LUNG (HCC): Primary | ICD-10-CM

## 2024-08-01 PROCEDURE — 3017F COLORECTAL CA SCREEN DOC REV: CPT | Performed by: INTERNAL MEDICINE

## 2024-08-01 PROCEDURE — 1090F PRES/ABSN URINE INCON ASSESS: CPT | Performed by: INTERNAL MEDICINE

## 2024-08-01 PROCEDURE — 1123F ACP DISCUSS/DSCN MKR DOCD: CPT | Performed by: INTERNAL MEDICINE

## 2024-08-01 PROCEDURE — G8399 PT W/DXA RESULTS DOCUMENT: HCPCS | Performed by: INTERNAL MEDICINE

## 2024-08-01 PROCEDURE — 3078F DIAST BP <80 MM HG: CPT | Performed by: INTERNAL MEDICINE

## 2024-08-01 PROCEDURE — 3074F SYST BP LT 130 MM HG: CPT | Performed by: INTERNAL MEDICINE

## 2024-08-01 PROCEDURE — 99214 OFFICE O/P EST MOD 30 MIN: CPT | Performed by: INTERNAL MEDICINE

## 2024-08-01 PROCEDURE — G8420 CALC BMI NORM PARAMETERS: HCPCS | Performed by: INTERNAL MEDICINE

## 2024-08-01 PROCEDURE — G8427 DOCREV CUR MEDS BY ELIG CLIN: HCPCS | Performed by: INTERNAL MEDICINE

## 2024-08-01 PROCEDURE — 4004F PT TOBACCO SCREEN RCVD TLK: CPT | Performed by: INTERNAL MEDICINE

## 2024-08-01 RX ORDER — VALACYCLOVIR HYDROCHLORIDE 1 G/1
1000 TABLET, FILM COATED ORAL 3 TIMES DAILY
Qty: 21 TABLET | Refills: 0 | Status: SHIPPED | OUTPATIENT
Start: 2024-08-01 | End: 2024-08-08

## 2024-08-01 RX ORDER — GABAPENTIN 300 MG/1
300 CAPSULE ORAL 3 TIMES DAILY
Qty: 270 CAPSULE | Refills: 1 | Status: SHIPPED | OUTPATIENT
Start: 2024-08-01 | End: 2025-01-28

## 2024-08-01 ASSESSMENT — PATIENT HEALTH QUESTIONNAIRE - PHQ9
SUM OF ALL RESPONSES TO PHQ QUESTIONS 1-9: 0
SUM OF ALL RESPONSES TO PHQ9 QUESTIONS 1 & 2: 0
SUM OF ALL RESPONSES TO PHQ QUESTIONS 1-9: 0
1. LITTLE INTEREST OR PLEASURE IN DOING THINGS: NOT AT ALL
SUM OF ALL RESPONSES TO PHQ QUESTIONS 1-9: 0
2. FEELING DOWN, DEPRESSED OR HOPELESS: NOT AT ALL
SUM OF ALL RESPONSES TO PHQ QUESTIONS 1-9: 0

## 2024-08-01 NOTE — PROGRESS NOTES
[facility] [place]  Medical Oncology at [facility]  [phone]    Hematology / Oncology Consult    Reason for Visit:   Thalia Arroyo is a 69 y.o. female who is seen in consultation at the request of Dr. Raheem Angel for NSCLC    History of Present Illness:   Dear colleague,       Just a brief communication regarding our mutual patient.  As you recall this 69-year-old female carries a history of a resected left sided lung malignancy dating back to 2009.  The patient's history is also noteworthy for coronary artery disease, hypertension as well as chronic obstructive pulmonary disease.  She continues to utilize tobacco.  More recently in April 2023 a new cough drove acquisition of a imaging study of the chest revealing a 3.3 x 3.0 x 2.2 nodule in the left upper lobe.  Pet imaging studies in early May 2023 revealed increased glucose avidity with an SUV of 11.2 and the dominant mass.  On 5/16/2023 the patient would undergo a biopsy revealing the presence of a squamous cell carcinoma.  Subsequent EBUS with evaluation of micheal stations 11 L, 7 and 4R were all negative.  She was staged at T2 N0 M0.  Subsequent pulmonary function studies revealed an FEV1 of 1.28 as well as a severely compromised DLCO.  Patient was deemed inappropriate for a surgical approach and was referred to radiation oncology for SBRT.  She completed her course.  She has done well since her most recent CT imaging studies revealed a continued decrease in the left upper lobe lesion now measuring 2.0 x 1.5 with contiguous radiation changes.  Clinically the patient is doing generally well.  She her dyspnea is at baseline.  She denies any bone pain or back pain no cough dyspnea or chest discomfort over and above her baseline.  She reports no cephalgia or changes in visual acuity no focal neurologic signs or symptoms.  She reports no soft tissue masses or adenopathy.  She has had a painful eruption in the right chest at approximately T4/T5

## 2024-08-01 NOTE — PROGRESS NOTES
Thalia Arroyo is a 69 y.o. female  Chief Complaint   Patient presents with    Other     Non-small cell cancer of left lung     1. Have you been to the ER, urgent care clinic since your last visit?  Hospitalized since your last visit?No    2. Have you seen or consulted any other health care providers outside of the [facility] since your last visit?  Include any pap smears or colon screening. No

## 2024-08-06 DIAGNOSIS — I10 ESSENTIAL (PRIMARY) HYPERTENSION: ICD-10-CM

## 2024-08-06 RX ORDER — LISINOPRIL 5 MG/1
TABLET ORAL
Qty: 90 TABLET | Refills: 0 | OUTPATIENT
Start: 2024-08-06

## 2024-08-06 NOTE — TELEPHONE ENCOUNTER
Requested Prescriptions     Pending Prescriptions Disp Refills    lisinopril (PRINIVIL;ZESTRIL) 2.5 MG tablet [Pharmacy Med Name: LISINOPRIL 2.5 MG TABLET] 90 tablet 1     Sig: TAKE 1 TABLET BY MOUTH EVERY DAY     Refused Prescriptions Disp Refills    lisinopril (PRINIVIL;ZESTRIL) 5 MG tablet [Pharmacy Med Name: LISINOPRIL 5 MG TABLET] 90 tablet 0     Sig: TAKE 1 TABLET BY MOUTH EVERY DAY     Last seen:  4/19/24

## 2024-08-07 RX ORDER — LISINOPRIL 2.5 MG/1
2.5 TABLET ORAL DAILY
Qty: 90 TABLET | Refills: 1 | Status: SHIPPED | OUTPATIENT
Start: 2024-08-07

## 2024-09-10 ENCOUNTER — OFFICE VISIT (OUTPATIENT)
Age: 69
End: 2024-09-10
Payer: MEDICARE

## 2024-09-10 VITALS
TEMPERATURE: 98.4 F | HEIGHT: 63 IN | BODY MASS INDEX: 19.84 KG/M2 | RESPIRATION RATE: 20 BRPM | DIASTOLIC BLOOD PRESSURE: 62 MMHG | WEIGHT: 112 LBS | OXYGEN SATURATION: 97 % | HEART RATE: 72 BPM | SYSTOLIC BLOOD PRESSURE: 102 MMHG

## 2024-09-10 DIAGNOSIS — F41.1 GENERALIZED ANXIETY DISORDER WITH PANIC ATTACKS: ICD-10-CM

## 2024-09-10 DIAGNOSIS — M54.16 LUMBAR RADICULOPATHY: ICD-10-CM

## 2024-09-10 DIAGNOSIS — M81.0 AGE-RELATED OSTEOPOROSIS WITHOUT CURRENT PATHOLOGICAL FRACTURE: ICD-10-CM

## 2024-09-10 DIAGNOSIS — E78.5 HYPERLIPIDEMIA, UNSPECIFIED HYPERLIPIDEMIA TYPE: ICD-10-CM

## 2024-09-10 DIAGNOSIS — M54.16 LUMBAR RADICULOPATHY: Primary | ICD-10-CM

## 2024-09-10 DIAGNOSIS — F33.2 MAJOR DEPRESSIVE DISORDER, RECURRENT SEVERE WITHOUT PSYCHOTIC FEATURES (HCC): ICD-10-CM

## 2024-09-10 DIAGNOSIS — F41.0 GENERALIZED ANXIETY DISORDER WITH PANIC ATTACKS: ICD-10-CM

## 2024-09-10 DIAGNOSIS — I25.10 CORONARY ARTERY DISEASE INVOLVING NATIVE HEART WITHOUT ANGINA PECTORIS, UNSPECIFIED VESSEL OR LESION TYPE: ICD-10-CM

## 2024-09-10 DIAGNOSIS — J44.1 COPD EXACERBATION (HCC): ICD-10-CM

## 2024-09-10 DIAGNOSIS — C34.32 MALIGNANT NEOPLASM OF LOWER LOBE OF LEFT LUNG (HCC): Primary | ICD-10-CM

## 2024-09-10 PROCEDURE — 3017F COLORECTAL CA SCREEN DOC REV: CPT | Performed by: NURSE PRACTITIONER

## 2024-09-10 PROCEDURE — G8399 PT W/DXA RESULTS DOCUMENT: HCPCS | Performed by: NURSE PRACTITIONER

## 2024-09-10 PROCEDURE — G8420 CALC BMI NORM PARAMETERS: HCPCS | Performed by: NURSE PRACTITIONER

## 2024-09-10 PROCEDURE — 4004F PT TOBACCO SCREEN RCVD TLK: CPT | Performed by: NURSE PRACTITIONER

## 2024-09-10 PROCEDURE — 99214 OFFICE O/P EST MOD 30 MIN: CPT | Performed by: NURSE PRACTITIONER

## 2024-09-10 PROCEDURE — G8427 DOCREV CUR MEDS BY ELIG CLIN: HCPCS | Performed by: NURSE PRACTITIONER

## 2024-09-10 PROCEDURE — 3078F DIAST BP <80 MM HG: CPT | Performed by: NURSE PRACTITIONER

## 2024-09-10 PROCEDURE — 3074F SYST BP LT 130 MM HG: CPT | Performed by: NURSE PRACTITIONER

## 2024-09-10 PROCEDURE — 1090F PRES/ABSN URINE INCON ASSESS: CPT | Performed by: NURSE PRACTITIONER

## 2024-09-10 PROCEDURE — 1123F ACP DISCUSS/DSCN MKR DOCD: CPT | Performed by: NURSE PRACTITIONER

## 2024-09-10 PROCEDURE — 3023F SPIROM DOC REV: CPT | Performed by: NURSE PRACTITIONER

## 2024-09-10 RX ORDER — LEVOFLOXACIN 500 MG/1
500 TABLET, FILM COATED ORAL DAILY
Qty: 7 TABLET | Refills: 0 | Status: SHIPPED | OUTPATIENT
Start: 2024-09-10 | End: 2024-09-17

## 2024-09-10 RX ORDER — PREDNISONE 20 MG/1
20 TABLET ORAL 2 TIMES DAILY
Qty: 10 TABLET | Refills: 0 | Status: SHIPPED | OUTPATIENT
Start: 2024-09-10 | End: 2024-09-15

## 2024-09-10 SDOH — ECONOMIC STABILITY: INCOME INSECURITY: HOW HARD IS IT FOR YOU TO PAY FOR THE VERY BASICS LIKE FOOD, HOUSING, MEDICAL CARE, AND HEATING?: NOT HARD AT ALL

## 2024-09-10 SDOH — ECONOMIC STABILITY: FOOD INSECURITY: WITHIN THE PAST 12 MONTHS, THE FOOD YOU BOUGHT JUST DIDN'T LAST AND YOU DIDN'T HAVE MONEY TO GET MORE.: NEVER TRUE

## 2024-09-10 SDOH — ECONOMIC STABILITY: FOOD INSECURITY: WITHIN THE PAST 12 MONTHS, YOU WORRIED THAT YOUR FOOD WOULD RUN OUT BEFORE YOU GOT MONEY TO BUY MORE.: NEVER TRUE

## 2024-09-10 ASSESSMENT — PATIENT HEALTH QUESTIONNAIRE - PHQ9
1. LITTLE INTEREST OR PLEASURE IN DOING THINGS: NOT AT ALL
7. TROUBLE CONCENTRATING ON THINGS, SUCH AS READING THE NEWSPAPER OR WATCHING TELEVISION: NOT AT ALL
SUM OF ALL RESPONSES TO PHQ QUESTIONS 1-9: 0
SUM OF ALL RESPONSES TO PHQ QUESTIONS 1-9: 0
6. FEELING BAD ABOUT YOURSELF - OR THAT YOU ARE A FAILURE OR HAVE LET YOURSELF OR YOUR FAMILY DOWN: NOT AT ALL
8. MOVING OR SPEAKING SO SLOWLY THAT OTHER PEOPLE COULD HAVE NOTICED. OR THE OPPOSITE, BEING SO FIGETY OR RESTLESS THAT YOU HAVE BEEN MOVING AROUND A LOT MORE THAN USUAL: NOT AT ALL
SUM OF ALL RESPONSES TO PHQ QUESTIONS 1-9: 0
5. POOR APPETITE OR OVEREATING: NOT AT ALL
2. FEELING DOWN, DEPRESSED OR HOPELESS: NOT AT ALL
4. FEELING TIRED OR HAVING LITTLE ENERGY: NOT AT ALL
9. THOUGHTS THAT YOU WOULD BE BETTER OFF DEAD, OR OF HURTING YOURSELF: NOT AT ALL
10. IF YOU CHECKED OFF ANY PROBLEMS, HOW DIFFICULT HAVE THESE PROBLEMS MADE IT FOR YOU TO DO YOUR WORK, TAKE CARE OF THINGS AT HOME, OR GET ALONG WITH OTHER PEOPLE: NOT DIFFICULT AT ALL
3. TROUBLE FALLING OR STAYING ASLEEP: NOT AT ALL
SUM OF ALL RESPONSES TO PHQ QUESTIONS 1-9: 0
SUM OF ALL RESPONSES TO PHQ9 QUESTIONS 1 & 2: 0

## 2024-09-10 ASSESSMENT — ANXIETY QUESTIONNAIRES
5. BEING SO RESTLESS THAT IT IS HARD TO SIT STILL: NOT AT ALL
6. BECOMING EASILY ANNOYED OR IRRITABLE: NOT AT ALL
IF YOU CHECKED OFF ANY PROBLEMS ON THIS QUESTIONNAIRE, HOW DIFFICULT HAVE THESE PROBLEMS MADE IT FOR YOU TO DO YOUR WORK, TAKE CARE OF THINGS AT HOME, OR GET ALONG WITH OTHER PEOPLE: NOT DIFFICULT AT ALL
1. FEELING NERVOUS, ANXIOUS, OR ON EDGE: NOT AT ALL
2. NOT BEING ABLE TO STOP OR CONTROL WORRYING: NOT AT ALL
3. WORRYING TOO MUCH ABOUT DIFFERENT THINGS: NOT AT ALL
7. FEELING AFRAID AS IF SOMETHING AWFUL MIGHT HAPPEN: NOT AT ALL
4. TROUBLE RELAXING: NOT AT ALL
GAD7 TOTAL SCORE: 0

## 2024-09-18 LAB
DRUG NAME: NORMAL
DRUGS UR: NORMAL
MED LIST NOT PROVIDED?: NO
MED LIST ON REQUISITION?: NO
MED LIST ON SEPARATE FORM?: NO
NO MEDICATION USE?: NO
RX NORM CODE: NORMAL
RX NORM SOURCE: NORMAL
RX NORM TEXT: NORMAL
SEQUENCE NUMBER: NORMAL

## 2024-10-02 ENCOUNTER — HOSPITAL ENCOUNTER (EMERGENCY)
Facility: HOSPITAL | Age: 69
Discharge: HOME | End: 2024-10-02
Attending: EMERGENCY MEDICINE
Payer: MEDICARE

## 2024-10-02 ENCOUNTER — HOSPITAL ENCOUNTER (OUTPATIENT)
Facility: HOSPITAL | Age: 69
Discharge: HOME | End: 2024-10-05
Payer: MEDICARE

## 2024-10-02 VITALS
SYSTOLIC BLOOD PRESSURE: 125 MMHG | BODY MASS INDEX: 19.84 KG/M2 | HEART RATE: 58 BPM | TEMPERATURE: 97.9 F | RESPIRATION RATE: 16 BRPM | OXYGEN SATURATION: 95 % | DIASTOLIC BLOOD PRESSURE: 85 MMHG | HEIGHT: 63 IN | WEIGHT: 112 LBS

## 2024-10-02 DIAGNOSIS — B02.9 HERPES ZOSTER WITHOUT COMPLICATION: Primary | ICD-10-CM

## 2024-10-02 DIAGNOSIS — M81.0 AGE-RELATED OSTEOPOROSIS WITHOUT CURRENT PATHOLOGICAL FRACTURE: ICD-10-CM

## 2024-10-02 PROCEDURE — 99283 EMERGENCY DEPT VISIT LOW MDM: CPT

## 2024-10-02 PROCEDURE — 77080 DXA BONE DENSITY AXIAL: CPT

## 2024-10-02 RX ORDER — LIDOCAINE 30 MG/G
CREAM TOPICAL
Qty: 85 G | Refills: 0 | Status: SHIPPED | OUTPATIENT
Start: 2024-10-02

## 2024-10-02 RX ORDER — PREDNISONE 50 MG/1
50 TABLET ORAL DAILY
Qty: 5 TABLET | Refills: 0 | Status: SHIPPED | OUTPATIENT
Start: 2024-10-02 | End: 2024-10-07

## 2024-10-02 RX ORDER — VALACYCLOVIR HYDROCHLORIDE 1 G/1
1000 TABLET, FILM COATED ORAL 2 TIMES DAILY
Qty: 20 TABLET | Refills: 0 | Status: SHIPPED | OUTPATIENT
Start: 2024-10-02 | End: 2024-10-12

## 2024-10-02 RX ORDER — ACETAMINOPHEN AND CODEINE PHOSPHATE 300; 30 MG/1; MG/1
1 TABLET ORAL EVERY 6 HOURS PRN
Qty: 8 TABLET | Refills: 0 | Status: SHIPPED | OUTPATIENT
Start: 2024-10-02 | End: 2024-10-05

## 2024-10-02 ASSESSMENT — PAIN DESCRIPTION - DESCRIPTORS: DESCRIPTORS: BURNING;SORE;TENDER

## 2024-10-02 ASSESSMENT — PAIN - FUNCTIONAL ASSESSMENT
PAIN_FUNCTIONAL_ASSESSMENT: 0-10
PAIN_FUNCTIONAL_ASSESSMENT: 0-10

## 2024-10-02 ASSESSMENT — PAIN SCALES - GENERAL
PAINLEVEL_OUTOF10: 7
PAINLEVEL_OUTOF10: 7

## 2024-10-02 ASSESSMENT — PAIN DESCRIPTION - LOCATION: LOCATION: CHEST

## 2024-10-02 NOTE — ED PROVIDER NOTES
[facility] EMERGENCY DEP  EMERGENCY DEPARTMENT ENCOUNTER       Pt Name: Thalia Arroyo  MRN: [id number]  Birthdate 1955  Date of evaluation: 10/2/2024  Provider: Sebas Moon DO   PCP: Marialuisa Costa APRN - NP  Note Started: 9:53 AM EDT 10/2/24     CHIEF COMPLAINT       Chief Complaint   Patient presents with    Rash     Possible shingles        HISTORY OF PRESENT ILLNESS: 1 or more elements      History From: Patient, History limited by: none     Thalia Arroyo is a 69 y.o. female past medical history significant for lung cancer presenting the emergency department complaining of a rash on her chest feels similar to when she had shingles a few months ago       Please See MDM for Additional Details of the HPI/PMH  Nursing Notes were all reviewed and agreed with or any disagreements were addressed in the HPI.     REVIEW OF SYSTEMS        Positives and Pertinent negatives as per HPI.    PAST HISTORY     Past Medical History:  Past Medical History:   Diagnosis Date    Coronary artery disease involving native coronary artery     AMI 2005    Essential hypertension 11/18/2022    Hx of cancer of lung 2009    DANE lobectomy    Mixed type COPD (chronic obstructive pulmonary disease) (HCC) 11/18/2022    Tobacco use disorder, continuous        Past Surgical History:  Past Surgical History:   Procedure Laterality Date    APPENDECTOMY N/A     CHOLECYSTECTOMY  2009    CT BIOPSY SOFT TISSUE NECK  5/16/2023    CT BIOPSY LUNG/MEDIASTINUM PERC 5/16/2023 [facility] RAD CT    LOBECTOMY Left 2009    DANE    LUMBAR LAMINECTOMY  1998    PTCA  2005    Stent x 2    TUBAL LIGATION         Family History:  Family History   Problem Relation Age of Onset    Heart Attack Mother     Lung Cancer Father     Substance Abuse Brother         ETOH, CHF    Prostate Cancer Brother     Diabetes Maternal Grandmother     Heart Failure Maternal Grandfather     No Known Problems Paternal Grandmother     No Known Problems Paternal Grandfather     Brain Cancer Maternal Cousin

## 2024-10-02 NOTE — ED TRIAGE NOTES
Pt arrived with a complaint of rash and pain.  Pt reports she recently had shingles in the same area and a few days ago noted a painful rash in the same area.  Pt is awake alert and oriented X 4, pt educated on ER flow

## 2024-10-15 NOTE — BH NOTE
Angelic Torres, SALINA Mercer [facility]  Department of Behavioral Health  Outpatient Follow Up Medication Management     Collateral: None     Patient Identification: Thalia Arroyo is a 69 year old  female. She lives alone in a rental home. She has two  sons, both of whom have two children. She sees her family about twice a month.  Relocated from [place], [place], along with one of her sons/his family to be near the other son, who relocated here from [place].  She is currently in treatment for recurrence of lung cancer.     Last Visit/interim information: Thalia had finished radiation. She was in the [facility] ED after a fall at home on December 7th; possibly associated with standing quickly after nitroglycerin use. MI ruled out at that time.  7/3/2023: CT of abdomen and  lung which Thalia states \"look pretty good.\"     Today:     Thalia Arroyo, who was evaluated through a patient-initiated, synchronous (real-time) audio only encounter, and/or her healthcare decision maker, is aware that it is a billable service, with coverage as determined by her insurance carrier. She provided verbal consent to proceed: Yes. She has not had a related appointment within my department in the past 7 days or scheduled within the next 24 hours. The patient was located at Home. Provider was located at [facility].     Time in: 0900    Time out: 0928     CC: Thalia presents for ongoing medication management of depression and anxiety    Thalia reports sprained a muscle in her leg after her DEXA scan. She feels the pain could be due to sciatica, as it originates in her lower back, buttock, and radiates down the affected side. She is using topical lidocaine and a cane to help with ambulation. She hasn't seen her PCP or an orthopedic specialist. She also recently had shingles for the second time, although she did complete the vaccine series in the past. Her pulmonary status is stable; she has her next CT scan next month in

## 2024-10-16 ENCOUNTER — HOSPITAL ENCOUNTER (OUTPATIENT)
Facility: HOSPITAL | Age: 69
Discharge: HOME | End: 2024-10-19

## 2024-10-16 DIAGNOSIS — F41.1 GENERALIZED ANXIETY DISORDER WITH PANIC ATTACKS: Primary | ICD-10-CM

## 2024-10-16 DIAGNOSIS — F32.9 MAJOR DEPRESSIVE DISORDER WITHOUT PSYCHOTIC FEATURES: ICD-10-CM

## 2024-10-16 DIAGNOSIS — F41.0 GENERALIZED ANXIETY DISORDER WITH PANIC ATTACKS: Primary | ICD-10-CM

## 2024-10-16 RX ORDER — LORAZEPAM 1 MG/1
1 TABLET ORAL EVERY 6 HOURS PRN
COMMUNITY
End: 2024-10-16

## 2024-10-16 RX ORDER — LORAZEPAM 1 MG/1
1 TABLET ORAL 2 TIMES DAILY PRN
Qty: 60 TABLET | Refills: 2 | Status: SHIPPED | OUTPATIENT
Start: 2024-10-16 | End: 2025-01-14

## 2024-10-23 DIAGNOSIS — I10 ESSENTIAL (PRIMARY) HYPERTENSION: ICD-10-CM

## 2024-10-23 DIAGNOSIS — J44.9 CHRONIC OBSTRUCTIVE PULMONARY DISEASE, UNSPECIFIED (HCC): ICD-10-CM

## 2024-10-23 DIAGNOSIS — I25.10 ATHEROSCLEROTIC HEART DISEASE OF NATIVE CORONARY ARTERY WITHOUT ANGINA PECTORIS: ICD-10-CM

## 2024-10-23 RX ORDER — FLUTICASONE PROPIONATE AND SALMETEROL XINAFOATE 115; 21 UG/1; UG/1
2 AEROSOL, METERED RESPIRATORY (INHALATION) 2 TIMES DAILY
Qty: 1 EACH | Refills: 5 | Status: SHIPPED | OUTPATIENT
Start: 2024-10-23

## 2024-10-23 RX ORDER — ATORVASTATIN CALCIUM 40 MG/1
40 TABLET, FILM COATED ORAL DAILY
Qty: 90 TABLET | Refills: 1 | Status: SHIPPED | OUTPATIENT
Start: 2024-10-23

## 2024-10-23 RX ORDER — LISINOPRIL 5 MG/1
TABLET ORAL
Qty: 90 TABLET | Refills: 0 | OUTPATIENT
Start: 2024-10-23

## 2024-10-23 RX ORDER — ATORVASTATIN CALCIUM 40 MG/1
TABLET, FILM COATED ORAL
Qty: 90 TABLET | Refills: 1 | OUTPATIENT
Start: 2024-10-23

## 2024-10-23 NOTE — TELEPHONE ENCOUNTER
Patient requesting refill on     Requested Prescriptions     Pending Prescriptions Disp Refills    ADVAIR -21 MCG/ACT inhaler [Pharmacy Med Name: ADVAIR -21 MCG INHALER] 36 each 1     Sig: TAKE 2 PUFFS BY MOUTH TWICE A DAY        Last OV 4/19/2024

## 2024-10-23 NOTE — TELEPHONE ENCOUNTER
Patient requesting refill on     Requested Prescriptions     Pending Prescriptions Disp Refills    atorvastatin (LIPITOR) 40 MG tablet [Pharmacy Med Name: ATORVASTATIN 40 MG TABLET] 90 tablet 1     Sig: TAKE 1 TABLET BY MOUTH EVERY DAY    lisinopril (PRINIVIL;ZESTRIL) 5 MG tablet [Pharmacy Med Name: LISINOPRIL 5 MG TABLET] 90 tablet 0     Sig: TAKE 1 TABLET BY MOUTH EVERY DAY        Last OV 4/19/2024

## 2024-11-07 ENCOUNTER — TELEPHONE (OUTPATIENT)
Age: 69
End: 2024-11-07

## 2024-11-07 NOTE — TELEPHONE ENCOUNTER
A pharmacist (Marko) from [facility] called stating as of 1/1/25 Advair will no longer be a formulary drug. The alternate medications will be Symbicort or Wixella

## 2024-11-16 DIAGNOSIS — I25.10 CORONARY ARTERY DISEASE INVOLVING NATIVE CORONARY ARTERY OF NATIVE HEART WITHOUT ANGINA PECTORIS: ICD-10-CM

## 2024-11-16 DIAGNOSIS — J44.9 CHRONIC OBSTRUCTIVE PULMONARY DISEASE, UNSPECIFIED (HCC): ICD-10-CM

## 2024-11-16 DIAGNOSIS — I10 ESSENTIAL (PRIMARY) HYPERTENSION: ICD-10-CM

## 2024-11-17 RX ORDER — LISINOPRIL 5 MG/1
TABLET ORAL
Qty: 90 TABLET | Refills: 0 | OUTPATIENT
Start: 2024-11-17

## 2024-11-17 RX ORDER — CARVEDILOL 25 MG/1
25 TABLET ORAL 2 TIMES DAILY WITH MEALS
Qty: 180 TABLET | Refills: 0 | Status: SHIPPED | OUTPATIENT
Start: 2024-11-17

## 2024-11-17 RX ORDER — IPRATROPIUM BROMIDE 17 UG/1
AEROSOL, METERED RESPIRATORY (INHALATION)
Qty: 12.9 EACH | Refills: 5 | Status: SHIPPED | OUTPATIENT
Start: 2024-11-17

## 2024-11-21 NOTE — PROGRESS NOTES
[facility] [place] at [facility] Follow Up   Office Phone: [phone], Office Fax: [phone]    Date: 12/2/24    PATIENT PROFILE: Ms. Thalia Arroyo is a 69 y.o. who presents with the following diagnoses: DANE Squamous Cell Carcinoma    Patient Care Team:  Marialuisa Costa APRN - NP as PCP - General (Nurse Practitioner)  Marialuisa Costa APRN - NP as PCP - Empaneled Provider     HEMATOLOGIC/ONCOLOGIC HISTORY  Oncology History Overview Note   #Squamous Cell Carcinoma of DANE Lung, cT2 cN0 cM0  -2009- underwent surgical resection for L lung cancer (records unavailable)  -4/2023- experiencing increased cough, CT imaging revealed a 3.3 x 3.0 x 2.2 nodule in DANE  -5/16/23- FNA of lung mass- squamous cell carcinoma of lung, PDL1 TPS 25%  -5/2023- PET CT- increased avidity of DANE mass, no distant metastatic disease  -6/12/23- underwent EBUS/biopsy w/o involvement of mediastinal LN's  -underwent PFTs- FEV1 of 1.28, compromised DLCO, not felt to be surgical candidate  -through 9/7/23- completed course of SBRT to lung lesion ([place], [place])  -11/21/23- PET CT- avidity to DANE lesion, no overt distant metastatic disease  -2/28/24- CT Chest- decreased size of DANE lesion- 2.3 x 1.5 cm, stable changes in the L hilum  -7/3/24- CT Chest- spiculated DANE nodule is slightly decreased in size compared to prior exam, decreased prominence of L hilar soft tissue  -11/4/24- CT Chest ([place])- slight interval decrease in size of DANE mass (1.3 x 2.7 x 1.1 cm), no evidence of metastatic disease    Molecular Testing  -none to date    Pertinent History  PMHx:  -Heme/Onc- lung cancer  -CV- CAD, htn,   -/Nephro-  -Endo-  -Resp- COPD, tobacco use  -GI-  -HEENT-  -GYN-  -Rheum  -MSK/Neuro-    PSurgHx: appendectomy, cholecystectomy, L lobectomy, lumbar lamincectomy  PSocHx: lives in [place], [place], active tobacco user  PFamHx: reviewed       Malignant neoplasm of lower lobe of left lung (HCC)   5/31/2023 Initial

## 2024-12-02 ENCOUNTER — OFFICE VISIT (OUTPATIENT)
Age: 69
End: 2024-12-02
Payer: MEDICAID

## 2024-12-02 VITALS
BODY MASS INDEX: 19.99 KG/M2 | WEIGHT: 112.8 LBS | OXYGEN SATURATION: 96 % | TEMPERATURE: 97.9 F | HEART RATE: 64 BPM | SYSTOLIC BLOOD PRESSURE: 110 MMHG | HEIGHT: 63 IN | DIASTOLIC BLOOD PRESSURE: 67 MMHG | RESPIRATION RATE: 16 BRPM

## 2024-12-02 DIAGNOSIS — M79.604 RIGHT LEG PAIN: ICD-10-CM

## 2024-12-02 DIAGNOSIS — C34.92 NON-SMALL CELL CANCER OF LEFT LUNG (HCC): Primary | ICD-10-CM

## 2024-12-02 PROCEDURE — 3078F DIAST BP <80 MM HG: CPT | Performed by: STUDENT IN AN ORGANIZED HEALTH CARE EDUCATION/TRAINING PROGRAM

## 2024-12-02 PROCEDURE — 1123F ACP DISCUSS/DSCN MKR DOCD: CPT | Performed by: STUDENT IN AN ORGANIZED HEALTH CARE EDUCATION/TRAINING PROGRAM

## 2024-12-02 PROCEDURE — 99214 OFFICE O/P EST MOD 30 MIN: CPT | Performed by: STUDENT IN AN ORGANIZED HEALTH CARE EDUCATION/TRAINING PROGRAM

## 2024-12-02 PROCEDURE — 3074F SYST BP LT 130 MM HG: CPT | Performed by: STUDENT IN AN ORGANIZED HEALTH CARE EDUCATION/TRAINING PROGRAM

## 2024-12-02 ASSESSMENT — PATIENT HEALTH QUESTIONNAIRE - PHQ9
SUM OF ALL RESPONSES TO PHQ QUESTIONS 1-9: 0
2. FEELING DOWN, DEPRESSED OR HOPELESS: NOT AT ALL
SUM OF ALL RESPONSES TO PHQ9 QUESTIONS 1 & 2: 0
SUM OF ALL RESPONSES TO PHQ QUESTIONS 1-9: 0
SUM OF ALL RESPONSES TO PHQ QUESTIONS 1-9: 0
1. LITTLE INTEREST OR PLEASURE IN DOING THINGS: NOT AT ALL
SUM OF ALL RESPONSES TO PHQ QUESTIONS 1-9: 0

## 2024-12-02 NOTE — PROGRESS NOTES
Thalia Arroyo is a 69 y.o. female  Chief Complaint   Patient presents with    Other     Non-small cell cancer of left lung     1. Have you been to the ER, urgent care clinic since your last visit?  Hospitalized since your last visit?No    2. Have you seen or consulted any other health care providers outside of the [facility] since your last visit?  Include any pap smears or colon screening. No

## 2024-12-02 NOTE — PATIENT INSTRUCTIONS
It was nice meeting you.  We reviewed your past history and treatment for your lung cancer.  I will arrange for a CT scan in early 3/2025.  I will see you back several days following your 3/2025 CT scan.

## 2024-12-03 ENCOUNTER — TELEPHONE (OUTPATIENT)
Age: 69
End: 2024-12-03

## 2024-12-03 NOTE — TELEPHONE ENCOUNTER
----- Message from Jono MCLEOD sent at 12/3/2024 10:55 AM EST -----  Regarding: ECC Escalation To Practice  ECC Escalation To Practice      Type of Escalation: Acute Care Symptom  --------------------------------------------------------------------------------------------------------------------------    Information for Provider:  Patient is looking for appointment for: Symptom, Right leg can't walk properly  Reasons for Message: Unable to reach practice     Additional Information, pt was informed or advised by Jonathon Jim MD from [facility] that she needs to get her right leg check by the PCP Marialuisa Costa APRN - NP  Since she can't walk properly and would like to be tested.  --------------------------------------------------------------------------------------------------------------------------    Relationship to Patient: Self     Call Back Info: OK to leave message on voicemail  Preferred Call Back Number: Phone [phone]

## 2024-12-15 NOTE — BH NOTE
Angelic Torres, PMROXY Osorio [facility]al [facility]  Outpatient Follow Up Medication Management     Collateral: None     Patient Identification: Thalia Arroyo is a 69 year old  female. She lives alone in a rental home. She has two  sons, both of whom have two children. She sees her family about twice a month.  Relocated from [place], [place], along with one of her sons/his family to be near the other son, who relocated here from [place].  She is currently in treatment for recurrence of lung cancer.     Last Visit/interim information: Thalia had finished radiation. She was in the [facility] ED after a fall at home on December 7th; possibly associated with standing quickly after nitroglycerin use. MI ruled out at that time.  7/3/2023: CT of abdomen and  lung which Thalia states \"look pretty good.\"  12/5/2024: Phone note (Dr. Askew, ONC): The patient reports she has reestablish medical oncology follow-up with Dr. Jonathon Jim with [facility]. She had follow-up with him on 12/02/2024, and she reports that repeat imaging studies are scheduled for early March 2025. (Non small cell left lung cancer).     Today:      CC: Thalia presents for ongoing medication management of depression and anxiety    HPI:   Thalia is still using a cane due to right leg weakness, which started the day after her DEXA scan in October. She is seeing Marialuisa Costa to R/O DVT soon. Her latest lung imaging was reassuring but she is worried about calcifications in her arteries and is considering finding a new cardiologist. She saw Dr. Cavazos in July and has a follow up next July but she feels she may need an ECHO and closer follow up.     Her mood has been about the same without extraordinary anxiety or depression. She sees her children and grandchildren in the area. She has had no changes in her appetite.     She takes Ativan in the morning for anxiety and at bedtime for anxiety that would otherwise keep her from falling

## 2024-12-16 ENCOUNTER — HOSPITAL ENCOUNTER (OUTPATIENT)
Facility: HOSPITAL | Age: 69
Discharge: HOME | End: 2024-12-19

## 2024-12-16 DIAGNOSIS — F41.1 GENERALIZED ANXIETY DISORDER WITH PANIC ATTACKS: ICD-10-CM

## 2024-12-16 DIAGNOSIS — F51.05 INSOMNIA DUE TO ANXIETY AND FEAR: Primary | ICD-10-CM

## 2024-12-16 DIAGNOSIS — F41.0 GENERALIZED ANXIETY DISORDER WITH PANIC ATTACKS: ICD-10-CM

## 2024-12-16 DIAGNOSIS — F32.9 MAJOR DEPRESSIVE DISORDER WITHOUT PSYCHOTIC FEATURES: ICD-10-CM

## 2024-12-16 DIAGNOSIS — F40.9 INSOMNIA DUE TO ANXIETY AND FEAR: Primary | ICD-10-CM

## 2024-12-31 ENCOUNTER — OFFICE VISIT (OUTPATIENT)
Age: 69
End: 2024-12-31
Payer: MEDICARE

## 2024-12-31 VITALS
DIASTOLIC BLOOD PRESSURE: 58 MMHG | WEIGHT: 113.13 LBS | RESPIRATION RATE: 18 BRPM | TEMPERATURE: 97.1 F | BODY MASS INDEX: 20.04 KG/M2 | SYSTOLIC BLOOD PRESSURE: 102 MMHG | HEIGHT: 63 IN | HEART RATE: 68 BPM | OXYGEN SATURATION: 97 %

## 2024-12-31 DIAGNOSIS — M81.0 AGE-RELATED OSTEOPOROSIS WITHOUT CURRENT PATHOLOGICAL FRACTURE: ICD-10-CM

## 2024-12-31 DIAGNOSIS — F41.0 GENERALIZED ANXIETY DISORDER WITH PANIC ATTACKS: ICD-10-CM

## 2024-12-31 DIAGNOSIS — M54.16 LUMBAR RADICULOPATHY: Primary | ICD-10-CM

## 2024-12-31 DIAGNOSIS — J44.9 CHRONIC OBSTRUCTIVE PULMONARY DISEASE, UNSPECIFIED COPD TYPE (HCC): ICD-10-CM

## 2024-12-31 DIAGNOSIS — C34.92 NON-SMALL CELL CANCER OF LEFT LUNG (HCC): ICD-10-CM

## 2024-12-31 DIAGNOSIS — E78.00 HYPERCHOLESTEROLEMIA: ICD-10-CM

## 2024-12-31 DIAGNOSIS — I25.10 CORONARY ARTERY DISEASE INVOLVING NATIVE CORONARY ARTERY OF NATIVE HEART WITHOUT ANGINA PECTORIS: ICD-10-CM

## 2024-12-31 DIAGNOSIS — F41.1 GENERALIZED ANXIETY DISORDER WITH PANIC ATTACKS: ICD-10-CM

## 2024-12-31 PROCEDURE — 1159F MED LIST DOCD IN RCRD: CPT | Performed by: NURSE PRACTITIONER

## 2024-12-31 PROCEDURE — G8427 DOCREV CUR MEDS BY ELIG CLIN: HCPCS | Performed by: NURSE PRACTITIONER

## 2024-12-31 PROCEDURE — 1123F ACP DISCUSS/DSCN MKR DOCD: CPT | Performed by: NURSE PRACTITIONER

## 2024-12-31 PROCEDURE — 3074F SYST BP LT 130 MM HG: CPT | Performed by: NURSE PRACTITIONER

## 2024-12-31 PROCEDURE — 36415 COLL VENOUS BLD VENIPUNCTURE: CPT | Performed by: NURSE PRACTITIONER

## 2024-12-31 PROCEDURE — 3017F COLORECTAL CA SCREEN DOC REV: CPT | Performed by: NURSE PRACTITIONER

## 2024-12-31 PROCEDURE — 4004F PT TOBACCO SCREEN RCVD TLK: CPT | Performed by: NURSE PRACTITIONER

## 2024-12-31 PROCEDURE — 3023F SPIROM DOC REV: CPT | Performed by: NURSE PRACTITIONER

## 2024-12-31 PROCEDURE — 1090F PRES/ABSN URINE INCON ASSESS: CPT | Performed by: NURSE PRACTITIONER

## 2024-12-31 PROCEDURE — G8399 PT W/DXA RESULTS DOCUMENT: HCPCS | Performed by: NURSE PRACTITIONER

## 2024-12-31 PROCEDURE — G8420 CALC BMI NORM PARAMETERS: HCPCS | Performed by: NURSE PRACTITIONER

## 2024-12-31 PROCEDURE — 1160F RVW MEDS BY RX/DR IN RCRD: CPT | Performed by: NURSE PRACTITIONER

## 2024-12-31 PROCEDURE — 3078F DIAST BP <80 MM HG: CPT | Performed by: NURSE PRACTITIONER

## 2024-12-31 PROCEDURE — G8484 FLU IMMUNIZE NO ADMIN: HCPCS | Performed by: NURSE PRACTITIONER

## 2024-12-31 PROCEDURE — 1126F AMNT PAIN NOTED NONE PRSNT: CPT | Performed by: NURSE PRACTITIONER

## 2024-12-31 PROCEDURE — 99214 OFFICE O/P EST MOD 30 MIN: CPT | Performed by: NURSE PRACTITIONER

## 2024-12-31 RX ORDER — BUDESONIDE AND FORMOTEROL FUMARATE DIHYDRATE 80; 4.5 UG/1; UG/1
2 AEROSOL RESPIRATORY (INHALATION) 2 TIMES DAILY
Qty: 10.2 G | Refills: 3 | Status: SHIPPED | OUTPATIENT
Start: 2024-12-31

## 2024-12-31 ASSESSMENT — PATIENT HEALTH QUESTIONNAIRE - PHQ9
SUM OF ALL RESPONSES TO PHQ9 QUESTIONS 1 & 2: 0
SUM OF ALL RESPONSES TO PHQ QUESTIONS 1-9: 0
2. FEELING DOWN, DEPRESSED OR HOPELESS: NOT AT ALL
1. LITTLE INTEREST OR PLEASURE IN DOING THINGS: NOT AT ALL
SUM OF ALL RESPONSES TO PHQ QUESTIONS 1-9: 0

## 2024-12-31 NOTE — PROGRESS NOTES
Labs drawn in left arm per Marialuisa's orders. Pt tolerated well.\"Have you been to the ER, urgent care clinic since your last visit?  Hospitalized since your last visit?\"    NO    “Have you seen or consulted any other health care providers outside our system since your last visit?”    YES - When: approximately 3  weeks ago.  Where and Why: Dr Jim.    Have you had a mammogram?”   NO    Date of last Mammogram: 11/15/2018       “Have you had a colorectal cancer screening such as a colonoscopy/FIT/Cologuard?    NO    No colonoscopy on file  No cologuard on file  No FIT/FOBT on file   No flexible sigmoidoscopy on file          
Cigarettes     Start date: 1975     Passive exposure: Past    Smokeless tobacco: Never   Vaping Use    Vaping status: Never Used   Substance and Sexual Activity    Alcohol use: Not Currently     Comment: reports that she drinks occassionally (2 times a month approx 2 beers)    Drug use: Not Currently     Types: Marijuana (Weed), Cocaine     Comment: Per patient this is in the distant past    Sexual activity: Defer     Social Determinants of Health     Financial Resource Strain: Low Risk  (9/10/2024)    Overall Financial Resource Strain (CARDIA)     Difficulty of Paying Living Expenses: Not hard at all   Food Insecurity: No Food Insecurity (9/10/2024)    Hunger Vital Sign     Worried About Running Out of Food in the Last Year: Never true     Ran Out of Food in the Last Year: Never true   Transportation Needs: Unknown (9/10/2024)    PRAPARE - Transportation     Lack of Transportation (Non-Medical): No   Physical Activity: Inactive (4/19/2024)    Exercise Vital Sign     Days of Exercise per Week: 0 days     Minutes of Exercise per Session: 0 min   Housing Stability: Unknown (9/10/2024)    Housing Stability Vital Sign     Homeless in the Last Year: No       Family History   Problem Relation Age of Onset    Heart Attack Mother     Lung Cancer Father     Substance Abuse Brother         ETOH, CHF    Prostate Cancer Brother     Diabetes Maternal Grandmother     Heart Failure Maternal Grandfather     No Known Problems Paternal Grandmother     No Known Problems Paternal Grandfather     Brain Cancer Maternal Cousin        ROS:  Gen: denies fever, chills, or fatigue  HEENT:denies H/A, + nasal congestion, denies ear pain or sore throat  Resp: + cough, denies SOB or wheezing  CV: denies chest pain, pressure, or palpitations  Extremeties: denies edema, pallor, or cyanosis.  Denies claudication  Musculoskeletal: no joint pain, stiffness, or muscle cramps  Neuro: +chronic numbness/tingling both feet with occasional weakness. Denies

## 2025-01-01 LAB
ALBUMIN SERPL-MCNC: 3.7 G/DL (ref 3.5–5)
ALBUMIN/GLOB SERPL: 1.2 (ref 1.1–2.2)
ALP SERPL-CCNC: 125 U/L (ref 45–117)
ALT SERPL-CCNC: 36 U/L (ref 12–78)
ANION GAP SERPL CALC-SCNC: 4 MMOL/L (ref 2–12)
AST SERPL-CCNC: 25 U/L (ref 15–37)
BASOPHILS # BLD: 0 K/UL (ref 0–0.1)
BASOPHILS NFR BLD: 0 % (ref 0–1)
BILIRUB SERPL-MCNC: 0.5 MG/DL (ref 0.2–1)
BUN SERPL-MCNC: 10 MG/DL (ref 6–20)
BUN/CREAT SERPL: 14 (ref 12–20)
CALCIUM SERPL-MCNC: 9.3 MG/DL (ref 8.5–10.1)
CHLORIDE SERPL-SCNC: 107 MMOL/L (ref 97–108)
CHOLEST SERPL-MCNC: 126 MG/DL
CO2 SERPL-SCNC: 26 MMOL/L (ref 21–32)
CREAT SERPL-MCNC: 0.71 MG/DL (ref 0.55–1.02)
DIFFERENTIAL METHOD BLD: ABNORMAL
EOSINOPHIL # BLD: 0.1 K/UL (ref 0–0.4)
EOSINOPHIL NFR BLD: 1 % (ref 0–7)
ERYTHROCYTE [DISTWIDTH] IN BLOOD BY AUTOMATED COUNT: 12.9 % (ref 11.5–14.5)
GLOBULIN SER CALC-MCNC: 3.2 G/DL (ref 2–4)
GLUCOSE SERPL-MCNC: 157 MG/DL (ref 65–100)
HCT VFR BLD AUTO: 37.7 % (ref 35–47)
HDLC SERPL-MCNC: 51 MG/DL
HDLC SERPL: 2.5 (ref 0–5)
HGB BLD-MCNC: 12.4 G/DL (ref 11.5–16)
IMM GRANULOCYTES # BLD AUTO: 0 K/UL (ref 0–0.04)
IMM GRANULOCYTES NFR BLD AUTO: 0 % (ref 0–0.5)
LDLC SERPL CALC-MCNC: 49 MG/DL (ref 0–100)
LYMPHOCYTES # BLD: 2.4 K/UL (ref 0.8–3.5)
LYMPHOCYTES NFR BLD: 29 % (ref 12–49)
MCH RBC QN AUTO: 30.9 PG (ref 26–34)
MCHC RBC AUTO-ENTMCNC: 32.9 G/DL (ref 30–36.5)
MCV RBC AUTO: 94 FL (ref 80–99)
MONOCYTES # BLD: 0.5 K/UL (ref 0–1)
MONOCYTES NFR BLD: 6 % (ref 5–13)
NEUTS SEG # BLD: 5.3 K/UL (ref 1.8–8)
NEUTS SEG NFR BLD: 64 % (ref 32–75)
NRBC # BLD: 0 K/UL (ref 0–0.01)
NRBC BLD-RTO: 0 PER 100 WBC
PLATELET # BLD AUTO: 141 K/UL (ref 150–400)
POTASSIUM SERPL-SCNC: 3.9 MMOL/L (ref 3.5–5.1)
PROT SERPL-MCNC: 6.9 G/DL (ref 6.4–8.2)
RBC # BLD AUTO: 4.01 M/UL (ref 3.8–5.2)
SODIUM SERPL-SCNC: 137 MMOL/L (ref 136–145)
TRIGL SERPL-MCNC: 130 MG/DL
VLDLC SERPL CALC-MCNC: 26 MG/DL
WBC # BLD AUTO: 8.3 K/UL (ref 3.6–11)

## 2025-01-06 DIAGNOSIS — R73.01 ELEVATED FASTING GLUCOSE: Primary | ICD-10-CM

## 2025-01-08 DIAGNOSIS — F41.0 GENERALIZED ANXIETY DISORDER WITH PANIC ATTACKS: ICD-10-CM

## 2025-01-08 DIAGNOSIS — F41.1 GENERALIZED ANXIETY DISORDER WITH PANIC ATTACKS: ICD-10-CM

## 2025-01-08 RX ORDER — LORAZEPAM 1 MG/1
1 TABLET ORAL 2 TIMES DAILY PRN
Qty: 60 TABLET | Refills: 2 | Status: SHIPPED | OUTPATIENT
Start: 2025-01-08 | End: 2025-04-08

## 2025-01-22 ENCOUNTER — TELEPHONE (OUTPATIENT)
Age: 70
End: 2025-01-22

## 2025-01-22 NOTE — TELEPHONE ENCOUNTER
Pt aware 2 identifiers verified name and    She is going to make lab visit she says its hard to get a ride

## 2025-01-22 NOTE — TELEPHONE ENCOUNTER
Patient did not review my last [employer] message I sent her. Please let her know she needs to schedule a lab visit to be checked for diabetes. Needs A1C due to elevated fasting blood sugar. Order has been placed.

## 2025-01-27 ENCOUNTER — LAB (OUTPATIENT)
Age: 70
End: 2025-01-27
Payer: MEDICARE

## 2025-01-27 DIAGNOSIS — R73.01 ELEVATED FASTING GLUCOSE: ICD-10-CM

## 2025-01-27 PROBLEM — R73.03 PREDIABETES: Status: ACTIVE | Noted: 2025-01-27

## 2025-01-27 LAB — HBA1C MFR BLD: 6.3 %

## 2025-01-27 PROCEDURE — 83036 HEMOGLOBIN GLYCOSYLATED A1C: CPT

## 2025-02-12 DIAGNOSIS — I25.10 CORONARY ARTERY DISEASE INVOLVING NATIVE CORONARY ARTERY OF NATIVE HEART WITHOUT ANGINA PECTORIS: ICD-10-CM

## 2025-02-12 DIAGNOSIS — I10 ESSENTIAL (PRIMARY) HYPERTENSION: ICD-10-CM

## 2025-02-12 RX ORDER — CARVEDILOL 25 MG/1
25 TABLET ORAL 2 TIMES DAILY WITH MEALS
Qty: 180 TABLET | Refills: 0 | Status: SHIPPED | OUTPATIENT
Start: 2025-02-12

## 2025-02-12 NOTE — TELEPHONE ENCOUNTER
Patient requesting refill on     Requested Prescriptions     Pending Prescriptions Disp Refills    carvedilol (COREG) 25 MG tablet [Pharmacy Med Name: CARVEDILOL 25 MG TABLET] 180 tablet 0     Sig: TAKE 1 TABLET BY MOUTH TWICE A DAY WITH FOOD        Last OV 4/19/2024

## 2025-02-17 DIAGNOSIS — I10 ESSENTIAL (PRIMARY) HYPERTENSION: ICD-10-CM

## 2025-02-17 RX ORDER — LISINOPRIL 5 MG/1
TABLET ORAL
Qty: 90 TABLET | Refills: 0 | OUTPATIENT
Start: 2025-02-17

## 2025-02-17 NOTE — TELEPHONE ENCOUNTER
Requested Prescriptions     Pending Prescriptions Disp Refills    lisinopril (PRINIVIL;ZESTRIL) 5 MG tablet [Pharmacy Med Name: LISINOPRIL 5 MG TABLET] 90 tablet 0     Sig: TAKE 1 TABLET BY MOUTH EVERY DAY     Last seen:  12/31/24    Last filled:    lisinopril (PRINIVIL;ZESTRIL) 2.5 MG tablet [[id number]]    Order Details  Dose: 2.5 mg Route: Oral Frequency: DAILY   Dispense Quantity: 90 tablet Refills: 1          Sig: TAKE 1 TABLET BY MOUTH EVERY DAY         Start Date: 08/07/24          On 8/7/24, Lisinopril was filled with a decreased dosage.  The 5 MG has been discontinued.

## 2025-02-26 DIAGNOSIS — J44.9 CHRONIC OBSTRUCTIVE PULMONARY DISEASE, UNSPECIFIED (HCC): ICD-10-CM

## 2025-02-27 DIAGNOSIS — J44.9 CHRONIC OBSTRUCTIVE PULMONARY DISEASE, UNSPECIFIED (HCC): ICD-10-CM

## 2025-02-27 DIAGNOSIS — J44.9 CHRONIC OBSTRUCTIVE PULMONARY DISEASE, UNSPECIFIED COPD TYPE (HCC): ICD-10-CM

## 2025-02-27 RX ORDER — BUDESONIDE AND FORMOTEROL FUMARATE DIHYDRATE 160; 4.5 UG/1; UG/1
AEROSOL RESPIRATORY (INHALATION)
Qty: 1 EACH | Refills: 0 | OUTPATIENT
Start: 2025-02-27

## 2025-02-27 RX ORDER — BUDESONIDE AND FORMOTEROL FUMARATE DIHYDRATE 80; 4.5 UG/1; UG/1
2 AEROSOL RESPIRATORY (INHALATION) 2 TIMES DAILY
Qty: 10.2 G | Refills: 3 | Status: SHIPPED | OUTPATIENT
Start: 2025-02-27

## 2025-03-03 ENCOUNTER — HOSPITAL ENCOUNTER (OUTPATIENT)
Facility: HOSPITAL | Age: 70
Discharge: HOME | End: 2025-03-06
Attending: STUDENT IN AN ORGANIZED HEALTH CARE EDUCATION/TRAINING PROGRAM
Payer: MEDICARE

## 2025-03-03 DIAGNOSIS — C34.92 NON-SMALL CELL CANCER OF LEFT LUNG (HCC): ICD-10-CM

## 2025-03-03 PROCEDURE — 6360000004 HC RX CONTRAST MEDICATION: Performed by: STUDENT IN AN ORGANIZED HEALTH CARE EDUCATION/TRAINING PROGRAM

## 2025-03-03 PROCEDURE — 71260 CT THORAX DX C+: CPT

## 2025-03-03 RX ORDER — IOPAMIDOL 755 MG/ML
100 INJECTION, SOLUTION INTRAVASCULAR ONCE
Status: COMPLETED | OUTPATIENT
Start: 2025-03-03 | End: 2025-03-03

## 2025-03-03 RX ADMIN — IOPAMIDOL 80 ML: 755 INJECTION, SOLUTION INTRAVENOUS at 09:36

## 2025-03-07 ENCOUNTER — TELEPHONE (OUTPATIENT)
Age: 70
End: 2025-03-07

## 2025-03-07 RX ORDER — AZITHROMYCIN 250 MG/1
TABLET, FILM COATED ORAL
Qty: 6 TABLET | Refills: 0 | Status: SHIPPED | OUTPATIENT
Start: 2025-03-07 | End: 2025-03-17

## 2025-03-07 NOTE — TELEPHONE ENCOUNTER
Contacted patient and reviewed results of her 3/3/25 CT scan noting:    \"IMPRESSION:     1. The nodular density with post radiation changes is noted laterally in the  left lower lobe and this is unchanged. New bandlike parenchymal opacity  posterior to this is likely post radiation changes.  2. New areas of abnormality in the left lower lobe as described above with mild  consolidation and bronchial thickening consistent with infectious or  inflammatory process, mild pneumonia and bronchitis. There is also evidence for  bronchitis in the right lower lobe. Follow-up to resolution is suggested.  3. There is a lymph node in the mediastinum as described above that now measures  9 mm. This has increased in the interval. This could be reactive to the airspace  process in the left lower lobe. 23X\"    Patient notes that she has had increased productive cough with change in her sputum color.  She denies respiratory distress, confusion or high fevers.  She feels stable at home.      Given these symptoms and CT findings, will treat empirically for PNA.  -prescribed augmentin 875 mg bid x 7 days, azithromycin 500 mg day 1 followed by 250 mg daily for 4 days  -Patient to call us at completion of antibiotic course if her symptoms have not improved.    We will otherwise plan for follow up as previously planned.  Dr. Jonathon Jim

## 2025-03-16 NOTE — BH NOTE
without psychotic features, improved (F32.9)  Generalized anxiety disorder with panic attacks, improved (F41.1)  Insomnia due to anxiety and fear, improved (F51.05)     Plan:   Continue Ativan 1 mg BID as needed; end date 4/8  Discontinue trazodone   Follow up in 3 months or sooner as needed

## 2025-03-17 ENCOUNTER — HOSPITAL ENCOUNTER (OUTPATIENT)
Facility: HOSPITAL | Age: 70
Discharge: HOME | End: 2025-03-20
Payer: MEDICARE

## 2025-03-17 DIAGNOSIS — F32.9 MAJOR DEPRESSIVE DISORDER WITHOUT PSYCHOTIC FEATURES: Primary | ICD-10-CM

## 2025-03-17 DIAGNOSIS — F41.1 GENERALIZED ANXIETY DISORDER WITH PANIC ATTACKS: ICD-10-CM

## 2025-03-17 DIAGNOSIS — F41.0 GENERALIZED ANXIETY DISORDER WITH PANIC ATTACKS: ICD-10-CM

## 2025-03-17 DIAGNOSIS — F51.05 INSOMNIA DUE TO OTHER MENTAL DISORDER (CODE): ICD-10-CM

## 2025-03-17 PROCEDURE — 99214 OFFICE O/P EST MOD 30 MIN: CPT | Performed by: MIDWIFE

## 2025-03-17 RX ORDER — LORAZEPAM 1 MG/1
1 TABLET ORAL 2 TIMES DAILY PRN
Qty: 60 TABLET | Refills: 2 | Status: SHIPPED | OUTPATIENT
Start: 2025-04-08 | End: 2025-07-07

## 2025-03-21 NOTE — PROGRESS NOTES
[facility] [place] at [facility] General Follow Up Visit   Office Phone: [phone], Office Fax: [phone]    Date: 3/25/25    PATIENT PROFILE: Ms. Thalia Arroyo is a 69 y.o. who presents with the following diagnoses: NSCLC    PMH:   has a past medical history of Coronary artery disease involving native coronary artery, Essential hypertension, Hx of cancer of lung, Mixed type COPD (chronic obstructive pulmonary disease) (HCC), and Tobacco use disorder, continuous.    ALLERGIES:  Allergies   Allergen Reactions    Sulfa Antibiotics Hives        CURRENT MEDS:  Current Outpatient Medications   Medication Sig Dispense Refill    [START ON 4/8/2025] LORazepam (ATIVAN) 1 MG tablet Take 1 tablet by mouth 2 times daily as needed for Anxiety for up to 90 days. Max Daily Amount: 2 mg 60 tablet 2    budesonide-formoterol (SYMBICORT) 80-4.5 MCG/ACT AERO Inhale 2 puffs into the lungs 2 times daily 10.2 g 3    carvedilol (COREG) 25 MG tablet TAKE 1 TABLET BY MOUTH TWICE A DAY WITH FOOD 180 tablet 0    ATROVENT HFA 17 MCG/ACT inhaler INHALE 2 PUFFS BY MOUTH EVERY 4 HOURS AS NEEDED FOR WHEEZE 12.9 each 5    fluticasone-salmeterol (ADVAIR HFA) 115-21 MCG/ACT inhaler TAKE 2 PUFFS BY MOUTH TWICE A DAY 1 each 5    atorvastatin (LIPITOR) 40 MG tablet Take 1 tablet by mouth daily 90 tablet 1    lisinopril (PRINIVIL;ZESTRIL) 2.5 MG tablet TAKE 1 TABLET BY MOUTH EVERY DAY 90 tablet 1    gabapentin (NEURONTIN) 300 MG capsule Take 1 capsule by mouth 3 times daily for 180 days. Intended supply: 90 days Max Daily Amount: 900 mg 270 capsule 1    acetaminophen (TYLENOL) 325 MG tablet Take by mouth every 6 hours as needed      albuterol sulfate HFA (PROVENTIL;VENTOLIN;PROAIR) 108 (90 Base) MCG/ACT inhaler INHALE 2 PUFFS INTO THE LUNGS 4 TIMES DAILY AS NEEDED FOR WHEEZING. 8.5 each 5    Cholecalciferol 100 MCG (4000 UT) CAPS Take 1 tablet by mouth daily      aspirin 81 MG EC tablet Take by mouth daily      coenzyme Q10 100 MG CAPS

## 2025-03-24 DIAGNOSIS — I10 ESSENTIAL (PRIMARY) HYPERTENSION: ICD-10-CM

## 2025-03-24 RX ORDER — LISINOPRIL 5 MG/1
5 TABLET ORAL DAILY
Qty: 90 TABLET | Refills: 0 | OUTPATIENT
Start: 2025-03-24

## 2025-03-25 ENCOUNTER — OFFICE VISIT (OUTPATIENT)
Age: 70
End: 2025-03-25
Payer: MEDICARE

## 2025-03-25 VITALS
RESPIRATION RATE: 16 BRPM | SYSTOLIC BLOOD PRESSURE: 100 MMHG | DIASTOLIC BLOOD PRESSURE: 61 MMHG | HEIGHT: 63 IN | OXYGEN SATURATION: 95 % | BODY MASS INDEX: 19 KG/M2 | HEART RATE: 81 BPM | WEIGHT: 107.2 LBS | TEMPERATURE: 97.1 F

## 2025-03-25 DIAGNOSIS — Z85.118 HX OF CANCER OF LUNG: ICD-10-CM

## 2025-03-25 DIAGNOSIS — R63.4 WEIGHT LOSS: ICD-10-CM

## 2025-03-25 DIAGNOSIS — C34.92 NON-SMALL CELL CANCER OF LEFT LUNG (HCC): Primary | ICD-10-CM

## 2025-03-25 PROCEDURE — G8399 PT W/DXA RESULTS DOCUMENT: HCPCS | Performed by: STUDENT IN AN ORGANIZED HEALTH CARE EDUCATION/TRAINING PROGRAM

## 2025-03-25 PROCEDURE — 4004F PT TOBACCO SCREEN RCVD TLK: CPT | Performed by: STUDENT IN AN ORGANIZED HEALTH CARE EDUCATION/TRAINING PROGRAM

## 2025-03-25 PROCEDURE — 1159F MED LIST DOCD IN RCRD: CPT | Performed by: STUDENT IN AN ORGANIZED HEALTH CARE EDUCATION/TRAINING PROGRAM

## 2025-03-25 PROCEDURE — 1123F ACP DISCUSS/DSCN MKR DOCD: CPT | Performed by: STUDENT IN AN ORGANIZED HEALTH CARE EDUCATION/TRAINING PROGRAM

## 2025-03-25 PROCEDURE — 3017F COLORECTAL CA SCREEN DOC REV: CPT | Performed by: STUDENT IN AN ORGANIZED HEALTH CARE EDUCATION/TRAINING PROGRAM

## 2025-03-25 PROCEDURE — G8420 CALC BMI NORM PARAMETERS: HCPCS | Performed by: STUDENT IN AN ORGANIZED HEALTH CARE EDUCATION/TRAINING PROGRAM

## 2025-03-25 PROCEDURE — 1126F AMNT PAIN NOTED NONE PRSNT: CPT | Performed by: STUDENT IN AN ORGANIZED HEALTH CARE EDUCATION/TRAINING PROGRAM

## 2025-03-25 PROCEDURE — G8427 DOCREV CUR MEDS BY ELIG CLIN: HCPCS | Performed by: STUDENT IN AN ORGANIZED HEALTH CARE EDUCATION/TRAINING PROGRAM

## 2025-03-25 PROCEDURE — 99214 OFFICE O/P EST MOD 30 MIN: CPT | Performed by: STUDENT IN AN ORGANIZED HEALTH CARE EDUCATION/TRAINING PROGRAM

## 2025-03-25 PROCEDURE — 1090F PRES/ABSN URINE INCON ASSESS: CPT | Performed by: STUDENT IN AN ORGANIZED HEALTH CARE EDUCATION/TRAINING PROGRAM

## 2025-03-25 PROCEDURE — 3074F SYST BP LT 130 MM HG: CPT | Performed by: STUDENT IN AN ORGANIZED HEALTH CARE EDUCATION/TRAINING PROGRAM

## 2025-03-25 PROCEDURE — 3078F DIAST BP <80 MM HG: CPT | Performed by: STUDENT IN AN ORGANIZED HEALTH CARE EDUCATION/TRAINING PROGRAM

## 2025-03-25 ASSESSMENT — PATIENT HEALTH QUESTIONNAIRE - PHQ9
1. LITTLE INTEREST OR PLEASURE IN DOING THINGS: NOT AT ALL
SUM OF ALL RESPONSES TO PHQ QUESTIONS 1-9: 0
SUM OF ALL RESPONSES TO PHQ QUESTIONS 1-9: 0
2. FEELING DOWN, DEPRESSED OR HOPELESS: NOT AT ALL
SUM OF ALL RESPONSES TO PHQ QUESTIONS 1-9: 0
SUM OF ALL RESPONSES TO PHQ QUESTIONS 1-9: 0

## 2025-03-25 NOTE — PATIENT INSTRUCTIONS
It was nice seeing you.  We reviewed your recent CT scan results and how you have been feeling since undergoing pneumonia treatment.  We will plan for repeat CT scan in early June 2025.  I will see you back roughly 1 week after your CT scan.

## 2025-04-10 DIAGNOSIS — I10 ESSENTIAL (PRIMARY) HYPERTENSION: ICD-10-CM

## 2025-04-10 NOTE — TELEPHONE ENCOUNTER
Patient requesting refill on     Requested Prescriptions     Pending Prescriptions Disp Refills    lisinopril (PRINIVIL;ZESTRIL) 2.5 MG tablet [Pharmacy Med Name: LISINOPRIL 2.5 MG TABLET] 90 tablet 1     Sig: TAKE 1 TABLET BY MOUTH EVERY DAY        Last OV 4/19/2024

## 2025-04-12 RX ORDER — LISINOPRIL 2.5 MG/1
2.5 TABLET ORAL DAILY
Qty: 90 TABLET | Refills: 1 | Status: SHIPPED | OUTPATIENT
Start: 2025-04-12

## 2025-04-12 RX ORDER — ALBUTEROL SULFATE 90 UG/1
INHALANT RESPIRATORY (INHALATION)
Qty: 18 EACH | Refills: 0 | Status: SHIPPED | OUTPATIENT
Start: 2025-04-12

## 2025-04-15 DIAGNOSIS — I25.10 ATHEROSCLEROTIC HEART DISEASE OF NATIVE CORONARY ARTERY WITHOUT ANGINA PECTORIS: ICD-10-CM

## 2025-04-15 DIAGNOSIS — C34.92 NON-SMALL CELL CANCER OF LEFT LUNG (HCC): ICD-10-CM

## 2025-04-15 RX ORDER — GABAPENTIN 300 MG/1
300 CAPSULE ORAL 3 TIMES DAILY
Qty: 270 CAPSULE | Refills: 1 | Status: SHIPPED | OUTPATIENT
Start: 2025-04-15 | End: 2025-10-12

## 2025-04-15 NOTE — PROGRESS NOTES
Requested Prescriptions     Pending Prescriptions Disp Refills    gabapentin (NEURONTIN) 300 MG capsule 270 capsule 1     Sig: Take 1 capsule by mouth 3 times daily for 180 days. Intended supply: 90 days Max Daily Amount: 900 mg     Refill requested

## 2025-04-16 RX ORDER — ATORVASTATIN CALCIUM 40 MG/1
40 TABLET, FILM COATED ORAL DAILY
Qty: 90 TABLET | Refills: 1 | Status: SHIPPED | OUTPATIENT
Start: 2025-04-16

## 2025-04-21 SDOH — ECONOMIC STABILITY: INCOME INSECURITY: IN THE LAST 12 MONTHS, WAS THERE A TIME WHEN YOU WERE NOT ABLE TO PAY THE MORTGAGE OR RENT ON TIME?: NO

## 2025-04-21 SDOH — ECONOMIC STABILITY: FOOD INSECURITY: WITHIN THE PAST 12 MONTHS, YOU WORRIED THAT YOUR FOOD WOULD RUN OUT BEFORE YOU GOT MONEY TO BUY MORE.: NEVER TRUE

## 2025-04-21 SDOH — ECONOMIC STABILITY: FOOD INSECURITY: WITHIN THE PAST 12 MONTHS, THE FOOD YOU BOUGHT JUST DIDN'T LAST AND YOU DIDN'T HAVE MONEY TO GET MORE.: NEVER TRUE

## 2025-04-21 SDOH — ECONOMIC STABILITY: TRANSPORTATION INSECURITY
IN THE PAST 12 MONTHS, HAS THE LACK OF TRANSPORTATION KEPT YOU FROM MEDICAL APPOINTMENTS OR FROM GETTING MEDICATIONS?: YES

## 2025-04-23 ENCOUNTER — OFFICE VISIT (OUTPATIENT)
Age: 70
End: 2025-04-23
Payer: MEDICARE

## 2025-04-23 VITALS
OXYGEN SATURATION: 97 % | SYSTOLIC BLOOD PRESSURE: 135 MMHG | WEIGHT: 104.4 LBS | HEIGHT: 63 IN | DIASTOLIC BLOOD PRESSURE: 75 MMHG | HEART RATE: 60 BPM | RESPIRATION RATE: 18 BRPM | TEMPERATURE: 97.4 F | BODY MASS INDEX: 18.5 KG/M2

## 2025-04-23 DIAGNOSIS — C34.92 NON-SMALL CELL CANCER OF LEFT LUNG (HCC): ICD-10-CM

## 2025-04-23 DIAGNOSIS — Z91.81 AT HIGH RISK FOR FALLS: ICD-10-CM

## 2025-04-23 DIAGNOSIS — J44.9 CHRONIC OBSTRUCTIVE PULMONARY DISEASE, UNSPECIFIED COPD TYPE (HCC): ICD-10-CM

## 2025-04-23 DIAGNOSIS — Z00.00 MEDICARE ANNUAL WELLNESS VISIT, SUBSEQUENT: Primary | ICD-10-CM

## 2025-04-23 DIAGNOSIS — R63.4 ABNORMAL WEIGHT LOSS: ICD-10-CM

## 2025-04-23 DIAGNOSIS — I25.10 ATHEROSCLEROSIS OF NATIVE CORONARY ARTERY OF NATIVE HEART WITHOUT ANGINA PECTORIS: ICD-10-CM

## 2025-04-23 DIAGNOSIS — R22.2 SUBCUTANEOUS NODULE OF CHEST WALL: ICD-10-CM

## 2025-04-23 DIAGNOSIS — F33.2 MAJOR DEPRESSIVE DISORDER, RECURRENT SEVERE WITHOUT PSYCHOTIC FEATURES (HCC): ICD-10-CM

## 2025-04-23 DIAGNOSIS — R73.01 ELEVATED FASTING GLUCOSE: ICD-10-CM

## 2025-04-23 DIAGNOSIS — R73.03 PREDIABETES: ICD-10-CM

## 2025-04-23 LAB — HBA1C MFR BLD: 5.7 %

## 2025-04-23 PROCEDURE — 83036 HEMOGLOBIN GLYCOSYLATED A1C: CPT | Performed by: NURSE PRACTITIONER

## 2025-04-23 RX ORDER — MIRTAZAPINE 7.5 MG/1
7.5 TABLET, FILM COATED ORAL NIGHTLY
Qty: 90 TABLET | Refills: 0 | Status: SHIPPED | OUTPATIENT
Start: 2025-04-23

## 2025-04-23 ASSESSMENT — PATIENT HEALTH QUESTIONNAIRE - PHQ9
3. TROUBLE FALLING OR STAYING ASLEEP: NEARLY EVERY DAY
2. FEELING DOWN, DEPRESSED OR HOPELESS: NOT AT ALL
10. IF YOU CHECKED OFF ANY PROBLEMS, HOW DIFFICULT HAVE THESE PROBLEMS MADE IT FOR YOU TO DO YOUR WORK, TAKE CARE OF THINGS AT HOME, OR GET ALONG WITH OTHER PEOPLE: NOT DIFFICULT AT ALL
8. MOVING OR SPEAKING SO SLOWLY THAT OTHER PEOPLE COULD HAVE NOTICED. OR THE OPPOSITE, BEING SO FIGETY OR RESTLESS THAT YOU HAVE BEEN MOVING AROUND A LOT MORE THAN USUAL: NOT AT ALL
4. FEELING TIRED OR HAVING LITTLE ENERGY: NOT AT ALL
1. LITTLE INTEREST OR PLEASURE IN DOING THINGS: NOT AT ALL
SUM OF ALL RESPONSES TO PHQ QUESTIONS 1-9: 4
SUM OF ALL RESPONSES TO PHQ QUESTIONS 1-9: 4
9. THOUGHTS THAT YOU WOULD BE BETTER OFF DEAD, OR OF HURTING YOURSELF: NOT AT ALL
SUM OF ALL RESPONSES TO PHQ QUESTIONS 1-9: 4
5. POOR APPETITE OR OVEREATING: SEVERAL DAYS
SUM OF ALL RESPONSES TO PHQ QUESTIONS 1-9: 4
6. FEELING BAD ABOUT YOURSELF - OR THAT YOU ARE A FAILURE OR HAVE LET YOURSELF OR YOUR FAMILY DOWN: NOT AT ALL
7. TROUBLE CONCENTRATING ON THINGS, SUCH AS READING THE NEWSPAPER OR WATCHING TELEVISION: NOT AT ALL

## 2025-04-23 ASSESSMENT — LIFESTYLE VARIABLES
HOW OFTEN DO YOU HAVE A DRINK CONTAINING ALCOHOL: MONTHLY OR LESS
HOW MANY STANDARD DRINKS CONTAINING ALCOHOL DO YOU HAVE ON A TYPICAL DAY: 1 OR 2

## 2025-04-23 NOTE — PROGRESS NOTES
\"Have you been to the ER, urgent care clinic since your last visit?  Hospitalized since your last visit?\"    NO    “Have you seen or consulted any other health care providers outside of [facility] since your last visit?”    NO    Have you had a mammogram?”   NO    Date of last Mammogram: 11/15/2018         “Have you had a colorectal cancer screening such as a colonoscopy/FIT/Cologuard?    NO    No colonoscopy on file  No cologuard on file  No FIT/FOBT on file   No flexible sigmoidoscopy on file     Chief Complaint   Patient presents with    Medicare AWV    Ear Fullness     Rt, not sure if water or clogged up       Vitals:    04/23/25 0933   BP: 135/75   Pulse: 60   Resp: 18   Temp: 97.4 °F (36.3 °C)   SpO2: 97%       Click Here for Release of Records Request  
times daily for neuropathy in her legs. Feet will go numb. Has been taking it since 1998 and is afraid to come off. She has constant numbness and tingling. Occasional weakness.       Health maintenance  Mammo- declines- says \"what's the point? I already have lung cancer. I don't want to know if I have any more cancer\"  Colon cancer screening- not addressed today  PNA vaccines- rec'd Prevnar 20 in   Shingrix- got these when she lived in [place] (no available records)  Covid booster 2024- declines  RSV- declines    Patient Active Problem List   Diagnosis    Hx of cancer of lung    Coronary artery disease involving native coronary artery of native heart without angina pectoris    Mixed type COPD (chronic obstructive pulmonary disease) (HCC)    Essential hypertension    Tobacco use disorder, continuous    Major depressive disorder without psychotic features    Generalized anxiety disorder with panic attacks    Insomnia due to anxiety and fear    Protein calorie malnutrition    Non-small cell cancer of left lung (HCC)    Major depressive disorder, recurrent severe without psychotic features (HCC)    History of myocardial infarction    S/P drug eluting coronary stent placement    Malignant neoplasm of lower lobe of left lung (HCC)    Malignant neoplasm of upper lobe of left lung (HCC)    Hypercholesterolemia    Prediabetes       Past Medical History:   Diagnosis Date    Coronary artery disease involving native coronary artery     AMI 2005    Essential hypertension 11/18/2022    Hx of cancer of lung 2009    DANE lobectomy    Mixed type COPD (chronic obstructive pulmonary disease) (HCC) 11/18/2022    Tobacco use disorder, continuous          Current Outpatient Medications:     atorvastatin (LIPITOR) 40 MG tablet, TAKE 1 TABLET BY MOUTH EVERY DAY, Disp: 90 tablet, Rfl: 1    gabapentin (NEURONTIN) 300 MG capsule, Take 1 capsule by mouth 3 times daily for 180 days. Intended supply: 90 days Max Daily Amount: 900 mg, Disp: 270

## 2025-04-23 NOTE — PATIENT INSTRUCTIONS
[facility] Transportation Resources*  (Call [facility]/211 if need additional resources.)    [facility] Transit System  What they offer: Provides public transportation to the [facility] and parts of [place] and [facility].  Website: http://www.ridegrtc.com/  Phone Number: 795.563.8043  [facility] Specialized Services (CARE & CARE Plus)  What they offer: Provides public transportation access to individuals with disabilities who may not reasonably be able to use [facility] fixed route bus service. Provides wcmsak-bm-izueyijmffc services under the guidelines of the ADA.  Website: http://Your Policy Manager/services/specialized-transportation/care  Phone Number: 420.329.2066  [employer] Ride Connection  What they offer: Ride Connection provides 2 round trip rides/month to non-emergency medical appointments (must qualify)  Website:  https://seniorconnections-va.org/services/support-to-stay-home/ride-connections/  Phone Number: 246.921.3528  Eligibility Requirements: Individuals with disabilities (18+) or older adults (60+) and live in the [facility] or the [facility] [place] and [place].  Sliding scale fee system.  You must give 7 days notice to schedules rides, which are subject to availability.      [employer]  What they offer: Donation based transportation services for veterans, families in need, the elderly, and those with disabilities.  Website: https://www.Solo[employer]ocks.TalkMarkets/  Phone Number: 932.805.4359.  Please allow two weeks notice in scheduling rides, which are subject to availability.    Additional Information: Offers transport to appointments, grocery store, airport, etc. in the areas of [facility], [place], [place], and [place].      [facility] Transit - Community transit service serving [place], [place], [facility], [place], [place], [place], [place], [place],

## 2025-05-02 ENCOUNTER — HOSPITAL ENCOUNTER (OUTPATIENT)
Facility: HOSPITAL | Age: 70
Discharge: HOME | End: 2025-05-02
Attending: STUDENT IN AN ORGANIZED HEALTH CARE EDUCATION/TRAINING PROGRAM
Payer: MEDICARE

## 2025-05-02 DIAGNOSIS — C34.92 NON-SMALL CELL CANCER OF LEFT LUNG (HCC): ICD-10-CM

## 2025-05-02 LAB
BUN SERPL-MCNC: 16 MG/DL (ref 6–20)
CREAT SERPL-MCNC: 0.68 MG/DL (ref 0.55–1.02)

## 2025-05-02 PROCEDURE — 84520 ASSAY OF UREA NITROGEN: CPT

## 2025-05-02 PROCEDURE — 36415 COLL VENOUS BLD VENIPUNCTURE: CPT

## 2025-05-02 PROCEDURE — 6360000004 HC RX CONTRAST MEDICATION: Performed by: STUDENT IN AN ORGANIZED HEALTH CARE EDUCATION/TRAINING PROGRAM

## 2025-05-02 PROCEDURE — 71260 CT THORAX DX C+: CPT

## 2025-05-02 PROCEDURE — 74177 CT ABD & PELVIS W/CONTRAST: CPT

## 2025-05-02 PROCEDURE — 82565 ASSAY OF CREATININE: CPT

## 2025-05-02 RX ORDER — IOPAMIDOL 755 MG/ML
100 INJECTION, SOLUTION INTRAVASCULAR ONCE
Status: COMPLETED | OUTPATIENT
Start: 2025-05-02 | End: 2025-05-02

## 2025-05-02 RX ADMIN — IOPAMIDOL 100 ML: 755 INJECTION, SOLUTION INTRAVENOUS at 08:09

## 2025-05-07 ENCOUNTER — RESULTS FOLLOW-UP (OUTPATIENT)
Age: 70
End: 2025-05-07

## 2025-05-19 DIAGNOSIS — I25.10 CORONARY ARTERY DISEASE INVOLVING NATIVE CORONARY ARTERY OF NATIVE HEART WITHOUT ANGINA PECTORIS: ICD-10-CM

## 2025-05-19 DIAGNOSIS — I10 ESSENTIAL (PRIMARY) HYPERTENSION: ICD-10-CM

## 2025-05-19 RX ORDER — CARVEDILOL 25 MG/1
25 TABLET ORAL 2 TIMES DAILY WITH MEALS
Qty: 180 TABLET | Refills: 1 | Status: SHIPPED | OUTPATIENT
Start: 2025-05-19

## 2025-05-23 NOTE — PROGRESS NOTES
[facility] [place] at [facility] General Follow Up Visit   Office Phone: [phone], Office Fax: [phone]    Date: 5/28/25    PATIENT PROFILE: Ms. Thalia Arroyo is a 70 y.o. who presents with the following diagnoses: NSCLC    PMH:   has a past medical history of Coronary artery disease involving native coronary artery, Essential hypertension, Hx of cancer of lung, Mixed type COPD (chronic obstructive pulmonary disease) (HCC), and Tobacco use disorder, continuous.    ALLERGIES:  Allergies   Allergen Reactions    Sulfa Antibiotics Hives        CURRENT MEDS:  Current Outpatient Medications   Medication Sig Dispense Refill    carvedilol (COREG) 25 MG tablet TAKE 1 TABLET BY MOUTH TWICE A DAY WITH FOOD 180 tablet 1    atorvastatin (LIPITOR) 40 MG tablet TAKE 1 TABLET BY MOUTH EVERY DAY 90 tablet 1    gabapentin (NEURONTIN) 300 MG capsule Take 1 capsule by mouth 3 times daily for 180 days. Intended supply: 90 days Max Daily Amount: 900 mg 270 capsule 1    lisinopril (PRINIVIL;ZESTRIL) 2.5 MG tablet TAKE 1 TABLET BY MOUTH EVERY DAY 90 tablet 1    albuterol sulfate HFA (PROVENTIL;VENTOLIN;PROAIR) 108 (90 Base) MCG/ACT inhaler TAKE 2 PUFFS BY MOUTH EVERY 6 HOURS AS NEEDED FOR WHEEZE 18 each 0    LORazepam (ATIVAN) 1 MG tablet Take 1 tablet by mouth 2 times daily as needed for Anxiety for up to 90 days. Max Daily Amount: 2 mg 60 tablet 2    budesonide-formoterol (SYMBICORT) 80-4.5 MCG/ACT AERO Inhale 2 puffs into the lungs 2 times daily 10.2 g 3    ATROVENT HFA 17 MCG/ACT inhaler INHALE 2 PUFFS BY MOUTH EVERY 4 HOURS AS NEEDED FOR WHEEZE 12.9 each 5    acetaminophen (TYLENOL) 325 MG tablet Take by mouth every 6 hours as needed      Cholecalciferol 100 MCG (4000 UT) CAPS Take 1 tablet by mouth daily      aspirin 81 MG EC tablet Take by mouth daily      coenzyme Q10 100 MG CAPS capsule Take 1 capsule by mouth daily      nitroGLYCERIN (NITROSTAT) 0.4 MG SL tablet Place 1 tablet under the tongue      mirtazapine

## 2025-05-28 ENCOUNTER — OFFICE VISIT (OUTPATIENT)
Age: 70
End: 2025-05-28
Payer: MEDICARE

## 2025-05-28 VITALS
HEIGHT: 63 IN | OXYGEN SATURATION: 98 % | BODY MASS INDEX: 18.18 KG/M2 | SYSTOLIC BLOOD PRESSURE: 110 MMHG | WEIGHT: 102.6 LBS | TEMPERATURE: 97.5 F | HEART RATE: 77 BPM | DIASTOLIC BLOOD PRESSURE: 61 MMHG | RESPIRATION RATE: 16 BRPM

## 2025-05-28 DIAGNOSIS — G89.29 CHRONIC NONINTRACTABLE HEADACHE, UNSPECIFIED HEADACHE TYPE: ICD-10-CM

## 2025-05-28 DIAGNOSIS — R51.9 CHRONIC NONINTRACTABLE HEADACHE, UNSPECIFIED HEADACHE TYPE: ICD-10-CM

## 2025-05-28 DIAGNOSIS — Z72.0 TOBACCO USE: ICD-10-CM

## 2025-05-28 DIAGNOSIS — C34.92 NON-SMALL CELL CANCER OF LEFT LUNG (HCC): Primary | ICD-10-CM

## 2025-05-28 PROCEDURE — G8419 CALC BMI OUT NRM PARAM NOF/U: HCPCS | Performed by: STUDENT IN AN ORGANIZED HEALTH CARE EDUCATION/TRAINING PROGRAM

## 2025-05-28 PROCEDURE — G8427 DOCREV CUR MEDS BY ELIG CLIN: HCPCS | Performed by: STUDENT IN AN ORGANIZED HEALTH CARE EDUCATION/TRAINING PROGRAM

## 2025-05-28 PROCEDURE — 1090F PRES/ABSN URINE INCON ASSESS: CPT | Performed by: STUDENT IN AN ORGANIZED HEALTH CARE EDUCATION/TRAINING PROGRAM

## 2025-05-28 PROCEDURE — 3078F DIAST BP <80 MM HG: CPT | Performed by: STUDENT IN AN ORGANIZED HEALTH CARE EDUCATION/TRAINING PROGRAM

## 2025-05-28 PROCEDURE — 3017F COLORECTAL CA SCREEN DOC REV: CPT | Performed by: STUDENT IN AN ORGANIZED HEALTH CARE EDUCATION/TRAINING PROGRAM

## 2025-05-28 PROCEDURE — 4004F PT TOBACCO SCREEN RCVD TLK: CPT | Performed by: STUDENT IN AN ORGANIZED HEALTH CARE EDUCATION/TRAINING PROGRAM

## 2025-05-28 PROCEDURE — G8399 PT W/DXA RESULTS DOCUMENT: HCPCS | Performed by: STUDENT IN AN ORGANIZED HEALTH CARE EDUCATION/TRAINING PROGRAM

## 2025-05-28 PROCEDURE — 99214 OFFICE O/P EST MOD 30 MIN: CPT | Performed by: STUDENT IN AN ORGANIZED HEALTH CARE EDUCATION/TRAINING PROGRAM

## 2025-05-28 PROCEDURE — 3074F SYST BP LT 130 MM HG: CPT | Performed by: STUDENT IN AN ORGANIZED HEALTH CARE EDUCATION/TRAINING PROGRAM

## 2025-05-28 PROCEDURE — 1123F ACP DISCUSS/DSCN MKR DOCD: CPT | Performed by: STUDENT IN AN ORGANIZED HEALTH CARE EDUCATION/TRAINING PROGRAM

## 2025-05-28 PROCEDURE — 1159F MED LIST DOCD IN RCRD: CPT | Performed by: STUDENT IN AN ORGANIZED HEALTH CARE EDUCATION/TRAINING PROGRAM

## 2025-05-28 ASSESSMENT — PATIENT HEALTH QUESTIONNAIRE - PHQ9
SUM OF ALL RESPONSES TO PHQ QUESTIONS 1-9: 0
SUM OF ALL RESPONSES TO PHQ QUESTIONS 1-9: 0
1. LITTLE INTEREST OR PLEASURE IN DOING THINGS: NOT AT ALL
2. FEELING DOWN, DEPRESSED OR HOPELESS: NOT AT ALL
SUM OF ALL RESPONSES TO PHQ QUESTIONS 1-9: 0
SUM OF ALL RESPONSES TO PHQ QUESTIONS 1-9: 0

## 2025-05-28 NOTE — PROGRESS NOTES
Thalia Arroyo is a 70 y.o. female    Chief Complaint   Patient presents with    Other     Non-small cell cancer of left lung       1. Have you been to the ER, urgent care clinic since your last visit?  Hospitalized since your last visit?No    2. Have you seen or consulted any other health care providers outside of the [facility] since your last visit?  Include any pap smears or colon screening. No

## 2025-06-07 RX ORDER — ALBUTEROL SULFATE 90 UG/1
INHALANT RESPIRATORY (INHALATION)
Qty: 18 EACH | Refills: 0 | Status: SHIPPED | OUTPATIENT
Start: 2025-06-07

## 2025-06-10 PROBLEM — I10 PRIMARY HYPERTENSION: Status: ACTIVE | Noted: 2022-11-18

## 2025-06-10 NOTE — PROGRESS NOTES
ASSESSMENT and PLAN  1. Coronary artery disease involving native coronary artery of native heart without angina pectoris  Status post MI with subsequent LAD PCI/MEHDI and ramus PCI/MEHDI in 2005.  No ischemia on Jeanne Cardiolite 6/8/2023.  Free of angina.  Continue current medications.    2. Primary hypertension  Well-controlled however her BP is borderline, heart rate low and she is experiencing postural lightheadedness.  Decrease carvedilol to 12.5 mg twice daily to see if this alleviates her postural lightheadedness.    3. Hypercholesterolemia  Labs 12/21/2024 demonstrated total cholesterol 126, triglyceride 130, HDL 51, LDL 49.  Continue atorvastatin as prescribed.    4. Tobacco use disorder, continuous  It is unlikely she will quit smoking.      Follow-up in 1 year.  The patient has been instructed and agrees to call our office with any issues or other concerns related to their cardiac condition(s) and/or complaint(s).      CHIEF COMPLAINT  Routine follow-up    HPI:    Thalia Arroyo is a 70 y.o. female here for follow-up of coronary artery disease. No cardiac issues have developed since the initial visit with me on 6/13/2024.  She denies chest pain, orthopnea, PND, lower extremity edema, palpitations, syncope or near syncope.  She is experiencing fatigue and some postural lightheadedness.  She continues to smoke.  She states she has peripheral neuropathy and some muscle wasting and she blames it on the atorvastatin.    PERTINENT CARDIAC HISTORY: (Records reviewed and summarized below)  Coronary artery disease  ==> s/p MI and stents x 2 in 2005 - 2.5 x 20 mm Taxus Express in LAD, 2.25 x 8 mm Taxus Express in ramus  ==> Jeanne Cardiolite 6/8/2023, EF 69%, no ischemia or infarction    Hypertension  Hyperlipidemia  Tobacco abuse, ongoing  COPD  Lung cancer, recurrent  ==> s/p left lobectomy 2009, XRT for recurrence 2023  Costochondritis  Lumbar disc disease  Chronic headache  Depression/anxiety    Past medical history,

## 2025-06-19 ENCOUNTER — OFFICE VISIT (OUTPATIENT)
Age: 70
End: 2025-06-19
Payer: MEDICARE

## 2025-06-19 VITALS
WEIGHT: 100 LBS | BODY MASS INDEX: 17.72 KG/M2 | TEMPERATURE: 97.5 F | DIASTOLIC BLOOD PRESSURE: 64 MMHG | SYSTOLIC BLOOD PRESSURE: 106 MMHG | HEART RATE: 52 BPM | OXYGEN SATURATION: 97 % | HEIGHT: 63 IN

## 2025-06-19 DIAGNOSIS — I25.10 CORONARY ARTERY DISEASE INVOLVING NATIVE CORONARY ARTERY OF NATIVE HEART WITHOUT ANGINA PECTORIS: Primary | ICD-10-CM

## 2025-06-19 DIAGNOSIS — I10 ESSENTIAL (PRIMARY) HYPERTENSION: ICD-10-CM

## 2025-06-19 DIAGNOSIS — I10 PRIMARY HYPERTENSION: ICD-10-CM

## 2025-06-19 DIAGNOSIS — E78.00 HYPERCHOLESTEROLEMIA: ICD-10-CM

## 2025-06-19 DIAGNOSIS — F17.209 TOBACCO USE DISORDER, CONTINUOUS: ICD-10-CM

## 2025-06-19 PROCEDURE — 3074F SYST BP LT 130 MM HG: CPT | Performed by: INTERNAL MEDICINE

## 2025-06-19 PROCEDURE — 1123F ACP DISCUSS/DSCN MKR DOCD: CPT | Performed by: INTERNAL MEDICINE

## 2025-06-19 PROCEDURE — G8419 CALC BMI OUT NRM PARAM NOF/U: HCPCS | Performed by: INTERNAL MEDICINE

## 2025-06-19 PROCEDURE — G8428 CUR MEDS NOT DOCUMENT: HCPCS | Performed by: INTERNAL MEDICINE

## 2025-06-19 PROCEDURE — 99214 OFFICE O/P EST MOD 30 MIN: CPT | Performed by: INTERNAL MEDICINE

## 2025-06-19 PROCEDURE — G8399 PT W/DXA RESULTS DOCUMENT: HCPCS | Performed by: INTERNAL MEDICINE

## 2025-06-19 PROCEDURE — 3078F DIAST BP <80 MM HG: CPT | Performed by: INTERNAL MEDICINE

## 2025-06-19 PROCEDURE — 3017F COLORECTAL CA SCREEN DOC REV: CPT | Performed by: INTERNAL MEDICINE

## 2025-06-19 PROCEDURE — 93000 ELECTROCARDIOGRAM COMPLETE: CPT | Performed by: INTERNAL MEDICINE

## 2025-06-19 PROCEDURE — 1090F PRES/ABSN URINE INCON ASSESS: CPT | Performed by: INTERNAL MEDICINE

## 2025-06-19 PROCEDURE — 4004F PT TOBACCO SCREEN RCVD TLK: CPT | Performed by: INTERNAL MEDICINE

## 2025-06-19 PROCEDURE — 1126F AMNT PAIN NOTED NONE PRSNT: CPT | Performed by: INTERNAL MEDICINE

## 2025-06-19 RX ORDER — CARVEDILOL 25 MG/1
12.5 TABLET ORAL 2 TIMES DAILY
Qty: 180 TABLET | Refills: 1 | Status: SHIPPED | OUTPATIENT
Start: 2025-06-19

## 2025-06-19 ASSESSMENT — PATIENT HEALTH QUESTIONNAIRE - PHQ9
2. FEELING DOWN, DEPRESSED OR HOPELESS: NOT AT ALL
SUM OF ALL RESPONSES TO PHQ QUESTIONS 1-9: 0
SUM OF ALL RESPONSES TO PHQ QUESTIONS 1-9: 0
1. LITTLE INTEREST OR PLEASURE IN DOING THINGS: NOT AT ALL
SUM OF ALL RESPONSES TO PHQ QUESTIONS 1-9: 0
SUM OF ALL RESPONSES TO PHQ QUESTIONS 1-9: 0

## 2025-06-19 NOTE — PROGRESS NOTES
Identified pt with two pt identifiers(name and ). Reviewed record in preparation for visit and have obtained necessary documentation.  Chief Complaint   Patient presents with    Coronary Artery Disease    Hypertension    Cholesterol Problem      /64 (BP Site: Left Upper Arm, Patient Position: Sitting, BP Cuff Size: Medium Adult)   Pulse 52   Temp 97.5 °F (36.4 °C) (Temporal)   Ht 1.6 m (5' 3\")   Wt 45.4 kg (100 lb)   SpO2 97%   BMI 17.71 kg/m²       Medications reviewed/approved by provider.      Health Maintenance Review: Patient reminded of \"due or due soon\" health maintenance. I have asked the patient to contact his/her primary care provider (PCP) for follow-up on his/her health maintenance.    Coordination of Care Questionnaire:  :   1) Have you been to an emergency room, urgent care, or hospitalized since your last visit?  If yes, where when, and reason for visit? no       2. Have seen or consulted any other health care provider since your last visit?   If yes, where when, and reason for visit?  no      Patient is accompanied by self I have received verbal consent from Thalia Arroyo to discuss any/all medical information while they are present in the room.

## 2025-07-31 ENCOUNTER — HOSPITAL ENCOUNTER (OUTPATIENT)
Facility: HOSPITAL | Age: 70
Discharge: HOME | End: 2025-08-03
Payer: MEDICARE

## 2025-07-31 DIAGNOSIS — F51.05 INSOMNIA DUE TO OTHER MENTAL DISORDER (CODE): ICD-10-CM

## 2025-07-31 DIAGNOSIS — F41.1 GENERALIZED ANXIETY DISORDER WITH PANIC ATTACKS: Primary | ICD-10-CM

## 2025-07-31 DIAGNOSIS — F32.9 MAJOR DEPRESSIVE DISORDER WITHOUT PSYCHOTIC FEATURES: ICD-10-CM

## 2025-07-31 DIAGNOSIS — F41.0 GENERALIZED ANXIETY DISORDER WITH PANIC ATTACKS: Primary | ICD-10-CM

## 2025-07-31 PROCEDURE — 99214 OFFICE O/P EST MOD 30 MIN: CPT | Performed by: MIDWIFE

## 2025-07-31 RX ORDER — LORAZEPAM 1 MG/1
1 TABLET ORAL 2 TIMES DAILY PRN
Qty: 60 TABLET | Refills: 2 | Status: SHIPPED | OUTPATIENT
Start: 2025-08-10 | End: 2025-11-08

## 2025-08-20 ENCOUNTER — TELEPHONE (OUTPATIENT)
Age: 70
End: 2025-08-20

## 2025-08-20 DIAGNOSIS — I10 ESSENTIAL (PRIMARY) HYPERTENSION: ICD-10-CM

## 2025-08-20 DIAGNOSIS — I25.10 CORONARY ARTERY DISEASE INVOLVING NATIVE CORONARY ARTERY OF NATIVE HEART WITHOUT ANGINA PECTORIS: ICD-10-CM

## 2025-08-20 RX ORDER — CARVEDILOL 25 MG/1
12.5 TABLET ORAL 2 TIMES DAILY
Qty: 90 TABLET | Refills: 1 | Status: SHIPPED | OUTPATIENT
Start: 2025-08-20

## 2025-08-21 DIAGNOSIS — I10 ESSENTIAL (PRIMARY) HYPERTENSION: ICD-10-CM

## 2025-08-21 DIAGNOSIS — I25.10 ATHEROSCLEROTIC HEART DISEASE OF NATIVE CORONARY ARTERY WITHOUT ANGINA PECTORIS: ICD-10-CM

## 2025-08-23 RX ORDER — ATORVASTATIN CALCIUM 40 MG/1
40 TABLET, FILM COATED ORAL DAILY
Qty: 90 TABLET | Refills: 1 | Status: SHIPPED | OUTPATIENT
Start: 2025-08-23

## 2025-08-23 RX ORDER — LISINOPRIL 2.5 MG/1
2.5 TABLET ORAL DAILY
Qty: 90 TABLET | Refills: 0 | Status: SHIPPED | OUTPATIENT
Start: 2025-08-23

## 2025-09-03 ENCOUNTER — HOSPITAL ENCOUNTER (OUTPATIENT)
Facility: HOSPITAL | Age: 70
Discharge: HOME | End: 2025-09-06
Attending: STUDENT IN AN ORGANIZED HEALTH CARE EDUCATION/TRAINING PROGRAM
Payer: MEDICARE

## 2025-09-03 DIAGNOSIS — C34.92 NON-SMALL CELL CANCER OF LEFT LUNG (HCC): ICD-10-CM

## 2025-09-03 LAB
BUN SERPL-MCNC: 20 MG/DL (ref 6–20)
CREAT SERPL-MCNC: 0.71 MG/DL (ref 0.55–1.02)

## 2025-09-03 PROCEDURE — 6360000004 HC RX CONTRAST MEDICATION: Performed by: STUDENT IN AN ORGANIZED HEALTH CARE EDUCATION/TRAINING PROGRAM

## 2025-09-03 PROCEDURE — 82565 ASSAY OF CREATININE: CPT

## 2025-09-03 PROCEDURE — 36415 COLL VENOUS BLD VENIPUNCTURE: CPT

## 2025-09-03 PROCEDURE — 84520 ASSAY OF UREA NITROGEN: CPT

## 2025-09-03 PROCEDURE — 74177 CT ABD & PELVIS W/CONTRAST: CPT

## 2025-09-03 RX ORDER — IOPAMIDOL 755 MG/ML
100 INJECTION, SOLUTION INTRAVASCULAR
Status: COMPLETED | OUTPATIENT
Start: 2025-09-03 | End: 2025-09-03

## 2025-09-03 RX ADMIN — IOPAMIDOL 100 ML: 755 INJECTION, SOLUTION INTRAVENOUS at 11:46
